# Patient Record
Sex: FEMALE | Race: WHITE | NOT HISPANIC OR LATINO | Employment: FULL TIME | URBAN - METROPOLITAN AREA
[De-identification: names, ages, dates, MRNs, and addresses within clinical notes are randomized per-mention and may not be internally consistent; named-entity substitution may affect disease eponyms.]

---
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
## 2018-02-06 ENCOUNTER — OFFICE VISIT (OUTPATIENT)
Dept: URGENT CARE | Facility: CLINIC | Age: 18
End: 2018-02-06
Payer: COMMERCIAL

## 2018-02-06 VITALS
HEART RATE: 71 BPM | SYSTOLIC BLOOD PRESSURE: 113 MMHG | BODY MASS INDEX: 20.7 KG/M2 | OXYGEN SATURATION: 97 % | TEMPERATURE: 97 F | WEIGHT: 92 LBS | RESPIRATION RATE: 18 BRPM | DIASTOLIC BLOOD PRESSURE: 77 MMHG | HEIGHT: 56 IN

## 2018-02-06 DIAGNOSIS — J06.9 UPPER RESPIRATORY TRACT INFECTION, UNSPECIFIED TYPE: Primary | ICD-10-CM

## 2018-02-06 PROCEDURE — 99214 OFFICE O/P EST MOD 30 MIN: CPT | Mod: S$GLB,,, | Performed by: FAMILY MEDICINE

## 2018-02-06 PROCEDURE — 3008F BODY MASS INDEX DOCD: CPT | Mod: S$GLB,,, | Performed by: FAMILY MEDICINE

## 2018-02-06 RX ORDER — TRIAMCINOLONE ACETONIDE 55 UG/1
2 SPRAY, METERED NASAL DAILY
Qty: 16.5 G | Refills: 0 | Status: SHIPPED | OUTPATIENT
Start: 2018-02-06 | End: 2019-08-01

## 2018-02-06 RX ORDER — BENZONATATE 100 MG/1
100 CAPSULE ORAL EVERY 6 HOURS PRN
Qty: 30 CAPSULE | Refills: 1 | Status: SHIPPED | OUTPATIENT
Start: 2018-02-06 | End: 2019-02-06

## 2018-02-06 NOTE — PROGRESS NOTES
"Subjective:       Patient ID: June Li is a 17 y.o. female.    Vitals:  height is 4' 8" (1.422 m) and weight is 41.7 kg (92 lb). Her temperature is 97 °F (36.1 °C). Her blood pressure is 113/77 and her pulse is 71. Her respiration is 18 and oxygen saturation is 97%.     Chief Complaint: Cough    Cough   This is a new problem. The current episode started in the past 7 days. The problem has been gradually worsening. The problem occurs every few minutes. The cough is productive of sputum. Associated symptoms include myalgias. Pertinent negatives include no chest pain, chills, ear pain, eye redness, fever, headaches, shortness of breath or wheezing. The symptoms are aggravated by lying down. Treatments tried: sudafed, dayquil and nyquil. The treatment provided no relief.     Review of Systems   Constitution: Negative for chills, fever and malaise/fatigue.   HENT: Positive for hoarse voice. Negative for congestion and ear pain.    Eyes: Negative for discharge and redness.   Cardiovascular: Negative for chest pain, dyspnea on exertion and leg swelling.   Respiratory: Positive for cough. Negative for shortness of breath, sputum production and wheezing.    Musculoskeletal: Positive for myalgias.   Gastrointestinal: Negative for abdominal pain and nausea.   Neurological: Negative for headaches.       Objective:      Physical Exam   Constitutional: She appears well-developed and well-nourished.   HENT:   Head: Normocephalic and atraumatic.   Nose: Mucosal edema and rhinorrhea present. Right sinus exhibits no maxillary sinus tenderness and no frontal sinus tenderness. Left sinus exhibits frontal sinus tenderness. Left sinus exhibits no maxillary sinus tenderness.   Mouth/Throat: Oropharynx is clear and moist.   Eyes: EOM are normal. Pupils are equal, round, and reactive to light.   Neck: Normal range of motion. Neck supple.   Cardiovascular: Normal rate, regular rhythm and normal heart sounds.    Pulmonary/Chest: " Effort normal and breath sounds normal.   Abdominal: Soft.   Lymphadenopathy:     She has cervical adenopathy (nontender).   Nursing note and vitals reviewed.      Assessment:       1. Upper respiratory tract infection, unspecified type        Plan:         Upper respiratory tract infection, unspecified type  -     benzonatate (TESSALON PERLES) 100 MG capsule; Take 1 capsule (100 mg total) by mouth every 6 (six) hours as needed for Cough.  Dispense: 30 capsule; Refill: 1  -     triamcinolone (NASACORT) 55 mcg nasal inhaler; 2 sprays by Nasal route once daily.  Dispense: 16.5 g; Refill: 0

## 2018-02-06 NOTE — PATIENT INSTRUCTIONS
Viral Upper Respiratory Illness (Adult)  You have a viral upper respiratory illness (URI), which is another term for the common cold. This illness is contagious during the first few days. It is spread through the air by coughing and sneezing. It may also be spread by direct contact (touching the sick person and then touching your own eyes, nose, or mouth). Frequent handwashing will decrease risk of spread. Most viral illnesses go away within 7 to 10 days with rest and simple home remedies. Sometimes the illness may last for several weeks. Antibiotics will not kill a virus, and they are generally not prescribed for this condition.    Home care  · If symptoms are severe, rest at home for the first 2 to 3 days. When you resume activity, don't let yourself get too tired.  · Avoid being exposed to cigarette smoke (yours or others).  · You may use acetaminophen or ibuprofen to control pain and fever, unless another medicine was prescribed. (Note: If you have chronic liver or kidney disease, have ever had a stomach ulcer or gastrointestinal bleeding, or are taking blood-thinning medicines, talk with your healthcare provider before using these medicines.) Aspirin should never be given to anyone under 18 years of age who is ill with a viral infection or fever. It may cause severe liver or brain damage.  · Your appetite may be poor, so a light diet is fine. Avoid dehydration by drinking 6 to 8 glasses of fluids per day (water, soft drinks, juices, tea, or soup). Extra fluids will help loosen secretions in the nose and lungs.  · Over-the-counter cold medicines will not shorten the length of time youre sick, but they may be helpful for the following symptoms: cough, sore throat, and nasal and sinus congestion. (Note: Do not use decongestants if you have high blood pressure.)  Follow-up care  Follow up with your healthcare provider, or as advised.  When to seek medical advice  Call your healthcare provider right away if any  of these occur:  · Cough with lots of colored sputum (mucus)  · Severe headache; face, neck, or ear pain  · Difficulty swallowing due to throat pain  · Fever of 100.4°F (38°C)  Call 911, or get immediate medical care  Call emergency services right away if any of these occur:  · Chest pain, shortness of breath, wheezing, or difficulty breathing  · Coughing up blood  · Inability to swallow due to throat pain  Date Last Reviewed: 9/13/2015  © 8433-2384 [employer]. [address] [place] [id number]. All rights reserved. This information is not intended as a substitute for professional medical care. Always follow your healthcare professional's instructions.

## 2019-06-28 ENCOUNTER — OFFICE VISIT (OUTPATIENT)
Dept: URGENT CARE | Facility: CLINIC | Age: 19
End: 2019-06-28
Payer: COMMERCIAL

## 2019-06-28 VITALS
SYSTOLIC BLOOD PRESSURE: 103 MMHG | OXYGEN SATURATION: 98 % | BODY MASS INDEX: 20.7 KG/M2 | WEIGHT: 92 LBS | DIASTOLIC BLOOD PRESSURE: 63 MMHG | HEART RATE: 88 BPM | HEIGHT: 56 IN | RESPIRATION RATE: 16 BRPM | TEMPERATURE: 98 F

## 2019-06-28 DIAGNOSIS — L03.031 PARONYCHIA OF GREAT TOE, RIGHT: Primary | ICD-10-CM

## 2019-06-28 PROCEDURE — 99214 OFFICE O/P EST MOD 30 MIN: CPT | Mod: S$GLB,,, | Performed by: NURSE PRACTITIONER

## 2019-06-28 PROCEDURE — 99214 PR OFFICE/OUTPT VISIT, EST, LEVL IV, 30-39 MIN: ICD-10-PCS | Mod: S$GLB,,, | Performed by: NURSE PRACTITIONER

## 2019-06-28 RX ORDER — MUPIROCIN 20 MG/G
OINTMENT TOPICAL
Qty: 22 G | Refills: 1 | Status: SHIPPED | OUTPATIENT
Start: 2019-06-28 | End: 2019-08-01

## 2019-06-28 RX ORDER — CEPHALEXIN 500 MG/1
500 CAPSULE ORAL EVERY 8 HOURS
Qty: 30 CAPSULE | Refills: 0 | Status: SHIPPED | OUTPATIENT
Start: 2019-06-28 | End: 2019-07-08

## 2019-06-28 NOTE — PROGRESS NOTES
"Subjective:       Patient ID: June Li is a 18 y.o. female.    Vitals:  height is 4' 8" (1.422 m) and weight is 41.7 kg (92 lb). Her oral temperature is 97.9 °F (36.6 °C). Her blood pressure is 103/63 and her pulse is 88. Her respiration is 16 and oxygen saturation is 98%.     Chief Complaint: Toe Pain    Toe Pain    The incident occurred 5 to 7 days ago. The incident occurred at home. There was no injury mechanism. The pain is present in the right toes (right great toe). The pain is at a severity of 9/10. The pain is severe. The pain has been constant since onset. Pertinent negatives include no inability to bear weight, loss of motion, loss of sensation, muscle weakness, numbness or tingling. She reports no foreign bodies present. The symptoms are aggravated by movement and weight bearing. She has tried acetaminophen for the symptoms. The treatment provided no relief.       Constitution: Negative for chills, fatigue and fever.   HENT: Negative for congestion and sore throat.    Neck: Negative for painful lymph nodes.   Cardiovascular: Negative for chest pain and leg swelling.   Eyes: Negative for double vision and blurred vision.   Respiratory: Negative for cough and shortness of breath.    Gastrointestinal: Negative for nausea, vomiting and diarrhea.   Genitourinary: Negative for dysuria, frequency, urgency and history of kidney stones.   Musculoskeletal: Negative for joint pain, joint swelling, muscle cramps and muscle ache.   Skin: Positive for erythema. Negative for color change, pale, rash and bruising.   Allergic/Immunologic: Negative for seasonal allergies.   Neurological: Negative for dizziness, history of vertigo, light-headedness, passing out, headaches and numbness.   Hematologic/Lymphatic: Negative for swollen lymph nodes.   Psychiatric/Behavioral: Negative for nervous/anxious, sleep disturbance and depression. The patient is not nervous/anxious.        Objective:      Physical Exam "   Constitutional: She is oriented to person, place, and time. She appears well-developed and well-nourished.   HENT:   Head: Normocephalic and atraumatic. Head is without abrasion, without contusion and without laceration.   Right Ear: External ear normal.   Left Ear: External ear normal.   Nose: Nose normal.   Eyes: Pupils are equal, round, and reactive to light. Conjunctivae, EOM and lids are normal.   Neck: Trachea normal, full passive range of motion without pain and phonation normal. Neck supple.   Cardiovascular: Normal rate, regular rhythm and normal heart sounds.   Pulmonary/Chest: Effort normal and breath sounds normal. No stridor. No respiratory distress.   Musculoskeletal: Normal range of motion.        Right foot: There is tenderness (TTP with erythema see diagram.  noted some abrasion to area as well.) and swelling. There is normal range of motion, no bony tenderness, normal capillary refill, no crepitus, no deformity and no laceration.        Feet:    Neurological: She is alert and oriented to person, place, and time.   Skin: Skin is warm and dry. Capillary refill takes less than 2 seconds. Abrasion noted. No bruising, no burn, no ecchymosis, no laceration, no lesion and no rash noted. She is not diaphoretic. There is erythema. No pallor.   Psychiatric: She has a normal mood and affect. Her speech is normal and behavior is normal. Judgment and thought content normal. Cognition and memory are normal.   Nursing note and vitals reviewed.      Assessment:       1. Paronychia of great toe, right        Plan:         Paronychia of great toe, right  -     cephALEXin (KEFLEX) 500 MG capsule; Take 1 capsule (500 mg total) by mouth every 8 (eight) hours. for 10 days  Dispense: 30 capsule; Refill: 0  -     mupirocin (BACTROBAN) 2 % ointment; Apply to affected area 3 times daily  Dispense: 22 g; Refill: 1      Patient Instructions   Please follow up with your Primary care provider within 2-5 days if your signs and  symptoms have not resolved or worsen.     If your condition worsens or fails to improve we recommend that you receive another evaluation at the emergency room immediately or contact your primary medical clinic to discuss your concerns.    You must understand that you have received an Urgent Care treatment only and that you may be released before all of your medical problems are known or treated.   You, the patient, will arrange for follow up care as instructed.       You have been given an antibiotic to treat your condition today.  Please complete the antibiotic as directed on the bottle.     As with any antibiotics, use probiotics and/or high culture yogurt about 2 hours apart from the antibiotic and about 1 week after the antibiotic to replace the gut ellie lost with antibiotic use.      If you are female and on BCP use additional methods to prevent pregnancy while on antibiotics and for one cycle after.         Paronychia of the Finger or Toe  Paronychia is an infection near a fingernail or toenail. It usually occurs when an opening in the cuticle or an ingrown toenail lets bacteria under the skin.  The infection will need to be drained if pus is present. If the infection has been caught early, you may need only antibiotic treatment. Healing will take about 1 to 2 weeks.  Home care  Follow these guidelines when caring for yourself at home:  · Clean and soak the toe or finger. Do this 2 times a day for the first 3 days. To do so:  ¨ Soak your foot or hand in a tub of warm water for 5 minutes. Or hold your toe or finger under a faucet of warm running water for 5 minutes.  ¨ Clean any crust away with soap and water using a cotton swab.  ¨ Put antibiotic ointment on the infected area.  · Change the dressing daily or any time it gets dirty.  · If you were given antibiotics, take them as directed until they are all gone.  · If your infection is on a toe, wear comfortable shoes with a lot of toe room. You can also wear  open-toed sandals while your toe heals.  · You may use over-the-counter medicine (acetaminophen or ibuprofen to help with pain, unless another medicine was prescribed. If you have chronic liver or kidney disease, talk with your healthcare provider before using these medicines. Also talk with your provider if you've had a stomach ulcer or GI (gastrointestinal) bleeding.  Prevention  The following can prevent paronychia:  · Avoid cutting or playing with your cuticles at home.  · Don't bite your nails.  · Don't suck on your thumbs or fingers.  Follow-up care  Follow up with your healthcare provider, or as advised.  When to seek medical advice  Call your healthcare provider right away if any of these occur:  · Redness, pain, or swelling of the finger or toe gets worse  · Red streaks in the skin leading away from the wound  · Pus or fluid draining from the nail area  · Fever of 100.4ºF (38ºC) or higher, or as directed by your provider  Date Last Reviewed: 8/1/2016 © 2000-2017 [employer]. [address] [place] [address], [place] [address]. All rights reserved. This information is not intended as a substitute for professional medical care. Always follow your healthcare professional's instructions.

## 2019-06-28 NOTE — PATIENT INSTRUCTIONS
Please follow up with your Primary care provider within 2-5 days if your signs and symptoms have not resolved or worsen.     If your condition worsens or fails to improve we recommend that you receive another evaluation at the emergency room immediately or contact your primary medical clinic to discuss your concerns.    You must understand that you have received an Urgent Care treatment only and that you may be released before all of your medical problems are known or treated.   You, the patient, will arrange for follow up care as instructed.       You have been given an antibiotic to treat your condition today.  Please complete the antibiotic as directed on the bottle.     As with any antibiotics, use probiotics and/or high culture yogurt about 2 hours apart from the antibiotic and about 1 week after the antibiotic to replace the gut ellie lost with antibiotic use.      If you are female and on BCP use additional methods to prevent pregnancy while on antibiotics and for one cycle after.         Paronychia of the Finger or Toe  Paronychia is an infection near a fingernail or toenail. It usually occurs when an opening in the cuticle or an ingrown toenail lets bacteria under the skin.  The infection will need to be drained if pus is present. If the infection has been caught early, you may need only antibiotic treatment. Healing will take about 1 to 2 weeks.  Home care  Follow these guidelines when caring for yourself at home:  · Clean and soak the toe or finger. Do this 2 times a day for the first 3 days. To do so:  ¨ Soak your foot or hand in a tub of warm water for 5 minutes. Or hold your toe or finger under a faucet of warm running water for 5 minutes.  ¨ Clean any crust away with soap and water using a cotton swab.  ¨ Put antibiotic ointment on the infected area.  · Change the dressing daily or any time it gets dirty.  · If you were given antibiotics, take them as directed until they are all gone.  · If your  infection is on a toe, wear comfortable shoes with a lot of toe room. You can also wear open-toed sandals while your toe heals.  · You may use over-the-counter medicine (acetaminophen or ibuprofen to help with pain, unless another medicine was prescribed. If you have chronic liver or kidney disease, talk with your healthcare provider before using these medicines. Also talk with your provider if you've had a stomach ulcer or GI (gastrointestinal) bleeding.  Prevention  The following can prevent paronychia:  · Avoid cutting or playing with your cuticles at home.  · Don't bite your nails.  · Don't suck on your thumbs or fingers.  Follow-up care  Follow up with your healthcare provider, or as advised.  When to seek medical advice  Call your healthcare provider right away if any of these occur:  · Redness, pain, or swelling of the finger or toe gets worse  · Red streaks in the skin leading away from the wound  · Pus or fluid draining from the nail area  · Fever of 100.4ºF (38ºC) or higher, or as directed by your provider  Date Last Reviewed: 8/1/2016  © 5860-6832 [employer]. [address] [place] [address], [place] [address]. All rights reserved. This information is not intended as a substitute for professional medical care. Always follow your healthcare professional's instructions.

## 2019-07-18 DIAGNOSIS — Z00.00 ROUTINE GENERAL MEDICAL EXAMINATION AT A HEALTH CARE FACILITY: Primary | ICD-10-CM

## 2019-08-01 ENCOUNTER — OFFICE VISIT (OUTPATIENT)
Dept: INTERNAL MEDICINE | Facility: CLINIC | Age: 19
End: 2019-08-01
Payer: COMMERCIAL

## 2019-08-01 ENCOUNTER — CLINICAL SUPPORT (OUTPATIENT)
Dept: INTERNAL MEDICINE | Facility: CLINIC | Age: 19
End: 2019-08-01
Payer: COMMERCIAL

## 2019-08-01 ENCOUNTER — HOSPITAL ENCOUNTER (OUTPATIENT)
Dept: RADIOLOGY | Facility: HOSPITAL | Age: 19
Discharge: HOME | End: 2019-08-01
Attending: INTERNAL MEDICINE
Payer: COMMERCIAL

## 2019-08-01 VITALS
WEIGHT: 86 LBS | BODY MASS INDEX: 19.9 KG/M2 | DIASTOLIC BLOOD PRESSURE: 60 MMHG | HEIGHT: 55 IN | SYSTOLIC BLOOD PRESSURE: 108 MMHG | HEART RATE: 82 BPM | OXYGEN SATURATION: 98 %

## 2019-08-01 DIAGNOSIS — N91.2 AMENORRHEA: ICD-10-CM

## 2019-08-01 DIAGNOSIS — Z00.00 ANNUAL PHYSICAL EXAM: Primary | ICD-10-CM

## 2019-08-01 DIAGNOSIS — Z00.00 ROUTINE GENERAL MEDICAL EXAMINATION AT A HEALTH CARE FACILITY: ICD-10-CM

## 2019-08-01 DIAGNOSIS — E03.9 HYPOTHYROIDISM, UNSPECIFIED TYPE: ICD-10-CM

## 2019-08-01 LAB
ALBUMIN SERPL BCP-MCNC: 3.6 G/DL (ref 3.5–5.2)
ALP SERPL-CCNC: 83 U/L (ref 55–135)
ALT SERPL W/O P-5'-P-CCNC: 13 U/L (ref 10–44)
ANION GAP SERPL CALC-SCNC: 8 MMOL/L (ref 8–16)
AST SERPL-CCNC: 22 U/L (ref 10–40)
BILIRUB SERPL-MCNC: 0.2 MG/DL (ref 0.1–1)
BUN SERPL-MCNC: 15 MG/DL (ref 6–20)
CALCIUM SERPL-MCNC: 9.3 MG/DL (ref 8.7–10.5)
CHLORIDE SERPL-SCNC: 103 MMOL/L (ref 95–110)
CHOLEST SERPL-MCNC: 181 MG/DL (ref 120–199)
CHOLEST/HDLC SERPL: 3 {RATIO} (ref 2–5)
CO2 SERPL-SCNC: 28 MMOL/L (ref 23–29)
CREAT SERPL-MCNC: 0.7 MG/DL (ref 0.5–1.4)
ERYTHROCYTE [DISTWIDTH] IN BLOOD BY AUTOMATED COUNT: 12.5 % (ref 11.5–14.5)
EST. GFR  (AFRICAN AMERICAN): >60 ML/MIN/1.73 M^2
EST. GFR  (NON AFRICAN AMERICAN): >60 ML/MIN/1.73 M^2
GLUCOSE SERPL-MCNC: 72 MG/DL (ref 70–110)
HCT VFR BLD AUTO: 41.5 % (ref 37–48.5)
HDLC SERPL-MCNC: 61 MG/DL (ref 40–75)
HDLC SERPL: 33.7 % (ref 20–50)
HGB BLD-MCNC: 13.6 G/DL (ref 12–16)
HIV 1+2 AB+HIV1 P24 AG SERPL QL IA: NEGATIVE
LDLC SERPL CALC-MCNC: 108 MG/DL (ref 63–159)
MCH RBC QN AUTO: 29.1 PG (ref 27–31)
MCHC RBC AUTO-ENTMCNC: 32.8 G/DL (ref 32–36)
MCV RBC AUTO: 89 FL (ref 82–98)
NONHDLC SERPL-MCNC: 120 MG/DL
PLATELET # BLD AUTO: 254 K/UL (ref 150–350)
PMV BLD AUTO: 9 FL (ref 9.2–12.9)
POTASSIUM SERPL-SCNC: 4 MMOL/L (ref 3.5–5.1)
PROT SERPL-MCNC: 7.4 G/DL (ref 6–8.4)
RBC # BLD AUTO: 4.67 M/UL (ref 4–5.4)
SODIUM SERPL-SCNC: 139 MMOL/L (ref 136–145)
TRIGL SERPL-MCNC: 60 MG/DL (ref 30–150)
WBC # BLD AUTO: 6.19 K/UL (ref 3.9–12.7)

## 2019-08-01 PROCEDURE — 71046 XR CHEST PA AND LATERAL: ICD-10-PCS | Mod: 26,,, | Performed by: RADIOLOGY

## 2019-08-01 PROCEDURE — 36415 COLL VENOUS BLD VENIPUNCTURE: CPT

## 2019-08-01 PROCEDURE — 80061 LIPID PANEL: CPT

## 2019-08-01 PROCEDURE — 71046 X-RAY EXAM CHEST 2 VIEWS: CPT | Mod: TC,FY

## 2019-08-01 PROCEDURE — 85027 COMPLETE CBC AUTOMATED: CPT

## 2019-08-01 PROCEDURE — 99999 PR PBB SHADOW E&M-EST. PATIENT-LVL III: ICD-10-PCS | Mod: PBBFAC,,, | Performed by: INTERNAL MEDICINE

## 2019-08-01 PROCEDURE — 99999 PR PBB SHADOW E&M-EST. PATIENT-LVL III: CPT | Mod: PBBFAC,,, | Performed by: INTERNAL MEDICINE

## 2019-08-01 PROCEDURE — 86703 HIV-1/HIV-2 1 RESULT ANTBDY: CPT

## 2019-08-01 PROCEDURE — 80053 COMPREHEN METABOLIC PANEL: CPT

## 2019-08-01 PROCEDURE — 71046 X-RAY EXAM CHEST 2 VIEWS: CPT | Mod: 26,,, | Performed by: RADIOLOGY

## 2019-08-01 PROCEDURE — 99385 PREV VISIT NEW AGE 18-39: CPT | Mod: S$PBB,,, | Performed by: INTERNAL MEDICINE

## 2019-08-01 PROCEDURE — 99385 PR PREVENTIVE VISIT,NEW,18-39: ICD-10-PCS | Mod: S$PBB,,, | Performed by: INTERNAL MEDICINE

## 2019-08-01 NOTE — LETTER
August 6, 2019    June Li  [address] [facility] [facility] [address] [place] [address]             [facility]  [address]  [facility] [address]  Phone: [phone]  Fax: [phone] Dear Ms. Li:    Thank you for allowing me to serve you and perform your Executive Health exam on 8/1/2019.  This letter will serve a brief summary of the history, physical findings, and laboratory/studies performed and recommendations at that time.    Reason for Visit: Executive Health Preventive Physical Examination    Past Medical History:   Diagnosis Date    Hypothyroidism     Hypothyroidism 8/1/2019       History reviewed. No pertinent surgical history.    Family History   Problem Relation Age of Onset    No Known Problems Mother     No Known Problems Father     No Known Problems Sister     No Known Problems Brother        Social History     Socioeconomic History    Marital status: Single     Spouse name: Not on file    Number of children: Not on file    Years of education: Not on file    Highest education level: Not on file   Occupational History    Occupation:    Social Needs    Financial resource strain: Not on file    Food insecurity:     Worry: Not on file     Inability: Not on file    Transportation needs:     Medical: Not on file     Non-medical: Not on file   Tobacco Use    Smoking status: Never Smoker    Smokeless tobacco: Never Used   Substance and Sexual Activity    Alcohol use: No     Comment: minimal    Drug use: No    Sexual activity: Never   Lifestyle    Physical activity:     Days per week: Not on file     Minutes per session: Not on file    Stress: Not on file   Relationships    Social connections:     Talks on phone: Not on file     Gets together: Not on file     Attends [employer] service: Not on file     Active member of club or organization: Not on file     Attends meetings of clubs or organizations: Not on file     Relationship status: Not on file    Other Topics Concern    Not on file   Social History Narrative    Exercise:  Gym 3 days a wek.        Review of patient's allergies indicates:   Allergen Reactions    Sulfa (sulfonamide antibiotics) Swelling         Current Outpatient Medications:     levothyroxine (SYNTHROID) 50 MCG tablet, Take 50 mcg by mouth once daily., Disp: , Rfl:      Review of Systems  Review of Systems - Negative except for chronic amenorrhea, managed by Dr Tsang.    Physical Exam:  General: General appearance: alert, well appearing, and in no distress.   Skin: Skin exam - normal coloration and turgor, no rashes, no suspicious skin lesions noted.  HEENT: Ears - bilateral TM's and external ear canals normal. , ENT exam reveals - ENT exam normal, no neck nodes or sinus tenderness.   Lungs: Chest: clear to auscultation, no wheezes, rales or rhonchi, symmetric air entry.   Heart: CVS exam: normal rate, regular rhythm, normal S1, S2, no murmurs, rubs, clicks or gallops.   Extremities: Exam of extremities: peripheral pulses normal, no pedal edema, no clubbing or cyanosis    Labs:  Results for orders placed or performed in visit on 08/01/19   Comprehensive metabolic panel   Result Value Ref Range    Sodium 139 136 - 145 mmol/L    Potassium 4.0 3.5 - 5.1 mmol/L    Chloride 103 95 - 110 mmol/L    CO2 28 23 - 29 mmol/L    Glucose 72 70 - 110 mg/dL    BUN, Bld 15 6 - 20 mg/dL    Creatinine 0.7 0.5 - 1.4 mg/dL    Calcium 9.3 8.7 - 10.5 mg/dL    Total Protein 7.4 6.0 - 8.4 g/dL    Albumin 3.6 3.5 - 5.2 g/dL    Total Bilirubin 0.2 0.1 - 1.0 mg/dL    Alkaline Phosphatase 83 55 - 135 U/L    AST 22 10 - 40 U/L    ALT 13 10 - 44 U/L    Anion Gap 8 8 - 16 mmol/L    eGFR if African American >60.0 >60 mL/min/1.73 m^2    eGFR if non African American >60.0 >60 mL/min/1.73 m^2   CBC Without Differential   Result Value Ref Range    WBC 6.19 3.90 - 12.70 K/uL    RBC 4.67 4.00 - 5.40 M/uL    Hemoglobin 13.6 12.0 - 16.0 g/dL    Hematocrit 41.5 37.0 - 48.5 %     Mean Corpuscular Volume 89 82 - 98 fL    Mean Corpuscular Hemoglobin 29.1 27.0 - 31.0 pg    Mean Corpuscular Hemoglobin Conc 32.8 32.0 - 36.0 g/dL    RDW 12.5 11.5 - 14.5 %    Platelets 254 150 - 350 K/uL    MPV 9.0 (L) 9.2 - 12.9 fL   Lipid panel   Result Value Ref Range    Cholesterol 181 120 - 199 mg/dL    Triglycerides 60 30 - 150 mg/dL    HDL 61 40 - 75 mg/dL    LDL Cholesterol 108.0 63.0 - 159.0 mg/dL    Hdl/Cholesterol Ratio 33.7 20.0 - 50.0 %    Total Cholesterol/HDL Ratio 3.0 2.0 - 5.0    Non-HDL Cholesterol 120 mg/dL   HIV 1/2 Ag/Ab (4th Gen)   Result Value Ref Range    HIV 1/2 Ag/Ab Negative Negative        Assessment/Recommendations:  Routine Health Maintenance    At this time, you appear to be in good medical condition.  I am awaiting a call back from Dr Tsang, as I don't have a clear diagnosis to explain your amenorrhea.     I look forward to seeing you again next year.  Please contact me should you have any questions or concerns regarding physical findings, or my recommendations.    If you have any questions or concerns, please don't hesitate to call.    Sincerely,    Leda Dangelo MD

## 2019-08-01 NOTE — PROGRESS NOTES
Subjective:       Patient ID: June Li is a 19 y.o. female.    Chief Complaint: Executive Health    HPI   SJ studying business.    Amenorrheic, lifelong.      Stopped growing in height at 9, due to inherited cause.     Sees a Reproductive Endocrinologist.  Started on OCP, which increased thirst, so she stopped.  Neither she nor mother are sure of diagnosis..      Hypothyroidism, tx with synthroid.    She plans to see a gynecologist soon.        Review of Systems   Constitutional: Negative for fever and unexpected weight change.   HENT: Negative for congestion and postnasal drip.    Respiratory: Negative for chest tightness, shortness of breath and wheezing.    Cardiovascular: Negative for chest pain and leg swelling.   Gastrointestinal: Negative for abdominal pain, anal bleeding, constipation, diarrhea, nausea and vomiting.   Genitourinary: Negative for dysuria and urgency.   Skin: Negative for rash.   Neurological: Negative for headaches.   Psychiatric/Behavioral: Negative for dysphoric mood and sleep disturbance. The patient is not nervous/anxious.        Objective:      Physical Exam   Constitutional: She is oriented to person, place, and time. She appears well-developed and well-nourished. No distress.   HENT:   Head: Normocephalic and atraumatic.   Mouth/Throat: Oropharynx is clear and moist. No oropharyngeal exudate.   Tm's clear   Eyes: No scleral icterus.   Neck: Neck supple. No JVD present. No thyromegaly present.   Cardiovascular: Normal rate, regular rhythm and normal heart sounds.   Pulmonary/Chest: Effort normal and breath sounds normal. No respiratory distress. She has no wheezes. She has no rales.   Abdominal: Soft. She exhibits no mass. There is no tenderness.   Musculoskeletal: She exhibits no edema.   Lymphadenopathy:     She has no cervical adenopathy.   Neurological: She is alert and oriented to person, place, and time.   Psychiatric: She has a normal mood and affect. Her behavior is  normal.       Results for orders placed or performed in visit on 08/01/19   Comprehensive metabolic panel   Result Value Ref Range    Sodium 139 136 - 145 mmol/L    Potassium 4.0 3.5 - 5.1 mmol/L    Chloride 103 95 - 110 mmol/L    CO2 28 23 - 29 mmol/L    Glucose 72 70 - 110 mg/dL    BUN, Bld 15 6 - 20 mg/dL    Creatinine 0.7 0.5 - 1.4 mg/dL    Calcium 9.3 8.7 - 10.5 mg/dL    Total Protein 7.4 6.0 - 8.4 g/dL    Albumin 3.6 3.5 - 5.2 g/dL    Total Bilirubin 0.2 0.1 - 1.0 mg/dL    Alkaline Phosphatase 83 55 - 135 U/L    AST 22 10 - 40 U/L    ALT 13 10 - 44 U/L    Anion Gap 8 8 - 16 mmol/L    eGFR if African American >60.0 >60 mL/min/1.73 m^2    eGFR if non African American >60.0 >60 mL/min/1.73 m^2   CBC Without Differential   Result Value Ref Range    WBC 6.19 3.90 - 12.70 K/uL    RBC 4.67 4.00 - 5.40 M/uL    Hemoglobin 13.6 12.0 - 16.0 g/dL    Hematocrit 41.5 37.0 - 48.5 %    Mean Corpuscular Volume 89 82 - 98 fL    Mean Corpuscular Hemoglobin 29.1 27.0 - 31.0 pg    Mean Corpuscular Hemoglobin Conc 32.8 32.0 - 36.0 g/dL    RDW 12.5 11.5 - 14.5 %    Platelets 254 150 - 350 K/uL    MPV 9.0 (L) 9.2 - 12.9 fL   Lipid panel   Result Value Ref Range    Cholesterol 181 120 - 199 mg/dL    Triglycerides 60 30 - 150 mg/dL    HDL 61 40 - 75 mg/dL    LDL Cholesterol 108.0 63.0 - 159.0 mg/dL    Hdl/Cholesterol Ratio 33.7 20.0 - 50.0 %    Total Cholesterol/HDL Ratio 3.0 2.0 - 5.0    Non-HDL Cholesterol 120 mg/dL     Assessment:       1. Annual physical exam    2. Hypothyroidism, unspecified type    3. Amenorrhea                     - will confer with Dr Tsang for diagnosis.  [phone]  Plan:       June CAMACHO was seen today for [facility].    Diagnoses and all orders for this visit:    Annual physical exam    Hypothyroidism, unspecified type    Amenorrhea

## 2019-08-13 ENCOUNTER — TELEPHONE (OUTPATIENT)
Dept: INTERNAL MEDICINE | Facility: CLINIC | Age: 19
End: 2019-08-13

## 2019-08-13 DIAGNOSIS — N91.2 AMENORRHEA: Primary | ICD-10-CM

## 2019-08-13 NOTE — TELEPHONE ENCOUNTER
pls call pt - I spoke to Dr Tsang - he doesn't have a diagnosis to explain why she is not having periods.     He did recommend checking a prolactin level.    i'd also like to check estradiol and FSH.    Pls schedule.

## 2019-08-16 ENCOUNTER — LAB VISIT (OUTPATIENT)
Dept: LAB | Facility: HOSPITAL | Age: 19
End: 2019-08-16
Attending: INTERNAL MEDICINE
Payer: COMMERCIAL

## 2019-08-16 DIAGNOSIS — N91.2 AMENORRHEA: ICD-10-CM

## 2019-08-16 LAB
ESTRADIOL SERPL-MCNC: 70 PG/ML
FSH SERPL-ACNC: 5.5 MIU/ML
PROLACTIN SERPL IA-MCNC: 46.8 NG/ML (ref 5.2–26.5)

## 2019-08-16 PROCEDURE — 82670 ASSAY OF TOTAL ESTRADIOL: CPT

## 2019-08-16 PROCEDURE — 36415 COLL VENOUS BLD VENIPUNCTURE: CPT | Mod: PO

## 2019-08-16 PROCEDURE — 84146 ASSAY OF PROLACTIN: CPT

## 2019-08-16 PROCEDURE — 83001 ASSAY OF GONADOTROPIN (FSH): CPT

## 2019-08-25 DIAGNOSIS — E22.1 HYPERPROLACTINEMIA: Primary | ICD-10-CM

## 2019-08-26 ENCOUNTER — TELEPHONE (OUTPATIENT)
Dept: INTERNAL MEDICINE | Facility: CLINIC | Age: 19
End: 2019-08-26

## 2019-08-26 NOTE — TELEPHONE ENCOUNTER
----- Message from Leda Dangelo MD sent at 8/25/2019  9:17 PM CDT -----  pls  Call - Prolactin, a hormone produced by the pituitary gland in the brain is mildly elevated.  This may be contributing to lack of periods.     I'd recommend MRI of brain to look more closely at the pituitary gland.  pls schedule.     FSH and estrogen levels are normal.     If she'd like I can call her later.

## 2019-08-26 NOTE — TELEPHONE ENCOUNTER
Spoke with pt, notified of results and scheduled MRI for Friday. Pt says she would like Dr. Dangelo to contact her mom and inform her please [phone]

## 2019-08-26 NOTE — TELEPHONE ENCOUNTER
----- Message from Aundrea Peguero sent at 8/26/2019  3:51 PM CDT -----  Contact: Father  Father is calling to speak with Staff regarding a test scheduled for the pt.    He can be reached at [phone].    Thank you.

## 2019-08-27 ENCOUNTER — TELEPHONE (OUTPATIENT)
Dept: INTERNAL MEDICINE | Facility: CLINIC | Age: 19
End: 2019-08-27

## 2019-08-27 DIAGNOSIS — F02.80 DEMENTIA IN HYDROCEPHALUS: ICD-10-CM

## 2019-08-27 DIAGNOSIS — G91.9 DEMENTIA IN HYDROCEPHALUS: ICD-10-CM

## 2019-08-27 NOTE — TELEPHONE ENCOUNTER
Pt called wanting to know if she can have her MRI done at [facility]. Can an external referral be placed?

## 2019-09-10 ENCOUNTER — TELEPHONE (OUTPATIENT)
Dept: INTERNAL MEDICINE | Facility: CLINIC | Age: 19
End: 2019-09-10

## 2019-09-10 NOTE — TELEPHONE ENCOUNTER
----- Message from Tara Ya sent at 9/10/2019 12:24 PM CDT -----  Contact: patient [phone]  Patient called again to confirm whether you received the second copy of the mri from yesterday that was faxed to you today.

## 2019-09-10 NOTE — TELEPHONE ENCOUNTER
----- Message from Dary Hubbard sent at 9/10/2019 11:56 AM CDT -----  Contact: self [phone]  Type: Test Results    What test was performed? MRI    Who ordered the test? [facility]    When and where were the test performed? Yesterday at [facility]    Comments:please call patient back

## 2019-09-11 ENCOUNTER — TELEPHONE (OUTPATIENT)
Dept: INTERNAL MEDICINE | Facility: CLINIC | Age: 19
End: 2019-09-11

## 2019-09-11 DIAGNOSIS — G93.89 SUPRASELLAR MASS: Primary | ICD-10-CM

## 2019-09-11 DIAGNOSIS — E22.1 HYPERPROLACTINEMIA: ICD-10-CM

## 2019-09-11 RX ORDER — AMOXICILLIN AND CLAVULANATE POTASSIUM 875; 125 MG/1; MG/1
1 TABLET, FILM COATED ORAL 2 TIMES DAILY
Qty: 20 TABLET | Refills: 0 | Status: SHIPPED | OUTPATIENT
Start: 2019-09-11 | End: 2019-09-16 | Stop reason: SDUPTHER

## 2019-09-11 NOTE — TELEPHONE ENCOUNTER
MRI without contrast reviewed.       Heterogeneous process suprasellar region.  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  Pituitary protocol MRI rec'd.        Pt and mother notified.         Additionally - chronic sinusitis changes of R ethmoid, maxilary and L fronto ethmoid air cells noted.  She c/o sinus congestion and facial pain.  Will call in augmentin.

## 2019-09-12 ENCOUNTER — TELEPHONE (OUTPATIENT)
Dept: INTERNAL MEDICINE | Facility: CLINIC | Age: 19
End: 2019-09-12

## 2019-09-12 ENCOUNTER — TELEPHONE (OUTPATIENT)
Dept: NEUROSURGERY | Facility: CLINIC | Age: 19
End: 2019-09-12

## 2019-09-12 DIAGNOSIS — D44.4 CRANIOPHARYNGIOMA: Primary | ICD-10-CM

## 2019-09-12 NOTE — TELEPHONE ENCOUNTER
----- Message from Leonora May sent at 9/12/2019  9:03 AM CDT -----  Contact: pt father Ruby [phone]  Pt father would like discuss MRI test done 9/11 at outside facility.  Please advise

## 2019-09-12 NOTE — TELEPHONE ENCOUNTER
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  Will scan report into [facility]    Will schedule appt with Dr Maggie reynolds

## 2019-09-12 NOTE — TELEPHONE ENCOUNTER
Called pt and scheduled consult with Dr. Serrano for tomorrow. She will bring copy of her MRI on disc.

## 2019-09-13 ENCOUNTER — TELEPHONE (OUTPATIENT)
Dept: NEUROSURGERY | Facility: CLINIC | Age: 19
End: 2019-09-13

## 2019-09-13 ENCOUNTER — OFFICE VISIT (OUTPATIENT)
Dept: NEUROSURGERY | Facility: CLINIC | Age: 19
End: 2019-09-13
Payer: COMMERCIAL

## 2019-09-13 VITALS
BODY MASS INDEX: 20.96 KG/M2 | WEIGHT: 90.19 LBS | TEMPERATURE: 98 F | DIASTOLIC BLOOD PRESSURE: 64 MMHG | SYSTOLIC BLOOD PRESSURE: 103 MMHG | HEART RATE: 82 BPM

## 2019-09-13 DIAGNOSIS — D44.4 CRANIOPHARYNGIOMA: Primary | ICD-10-CM

## 2019-09-13 DIAGNOSIS — D44.4 CRANIOPHARYNGIOMA IN ADULT: Primary | ICD-10-CM

## 2019-09-13 DIAGNOSIS — E34.30 SHORT STATURE DUE TO ENDOCRINE DISORDER: ICD-10-CM

## 2019-09-13 PROCEDURE — 99999 PR PBB SHADOW E&M-EST. PATIENT-LVL III: CPT | Mod: PBBFAC,,, | Performed by: NEUROLOGICAL SURGERY

## 2019-09-13 PROCEDURE — 99999 PR PBB SHADOW E&M-EST. PATIENT-LVL III: ICD-10-PCS | Mod: PBBFAC,,, | Performed by: NEUROLOGICAL SURGERY

## 2019-09-13 PROCEDURE — 99204 OFFICE O/P NEW MOD 45 MIN: CPT | Mod: S$GLB,,, | Performed by: NEUROLOGICAL SURGERY

## 2019-09-13 PROCEDURE — 99204 PR OFFICE/OUTPT VISIT, NEW, LEVL IV, 45-59 MIN: ICD-10-PCS | Mod: S$GLB,,, | Performed by: NEUROLOGICAL SURGERY

## 2019-09-13 NOTE — LETTER
September 17, 2019      Leda Dangelo MD  [address]  [facility] [address]           [facility] Neuro[facility] 7th Fl  [address]  [facility] [address]  Phone: [phone]          Patient: June Li   MR Number: [id number]   YOB: 2000   Date of Visit: 9/13/2019       Dear Dr. Leda Dangelo:    Thank you for referring June Li to me for evaluation. Attached you will find relevant portions of my assessment and plan of care.    If you have questions, please do not hesitate to call me. I look forward to following June Li along with you.    Sincerely,    Jung Serrano MD    Enclosure  CC:  No Recipients    If you would like to receive this communication electronically, please contact [email or web address] or [phone] to request more information on [employer] access.    For providers and/or their staff who would like to refer a patient to [facility], please contact us through our one-stop-shop provider referral line, [facility], at [phone].    If you feel you have received this communication in error or would no longer like to receive these types of communications, please e-mail [email or web address]

## 2019-09-13 NOTE — PROGRESS NOTES
"Subjective:   I, Raven Stewart, attest that this documentation has been prepared under the direction and in the presence of Jung Serrano MD.     Patient ID: June Li is a 19 y.o. female     Chief Complaint: Consult      HPI  Ms. June Li is a pleasant 19 y.o. woman who presents today for consultation regarding recently craniopharyngioma. Pt is 4'7" in height and has a fraternal twin sister, Nataly, who's 5'8'. Her parents say they noticed a difference in their heights around the age of 3. She has been seen by multiple physicians in the past with no confirmed etiology to account for her stunted growth. She had an absent menses, which prompted a follow up with an Endocrinologist, and was treated hormonally with no success. She had a new physical after her 19st birthday and had blood work that prompted a MRI Brain. Pt endorses pronounced polydipsia and states she sometimes drinks to the point of vomiting secondary to insatiable thirst. She states she goes to sleep at 11 pm and wakes up around 5-6am. She endorses waking up at least 1-2 times a night secondary to excessive thirst and is able to fell asleep without issue. She states she was on a contraceptive that elicited a menstrual cycle in the past but it unfortunately caused an exacerbation of her polydipsia, in addition to bodily shakes. She has a hx of headaches and episodes of blurry vision, which have both subsided. She denies excessive hunger. She is accompanied by both of her parents and younger brother today.      Review of Systems   Constitutional: Negative for activity change, fatigue, fever and unexpected weight change.   HENT: Negative for facial swelling.    Eyes: Positive for visual disturbance (one episode of blurry vision).   Respiratory: Negative.    Cardiovascular: Negative.    Gastrointestinal: Negative for diarrhea, nausea and vomiting.   Endocrine: Positive for polydipsia.        Positive for amenorrhea. Negative for excessive " hunger.   Genitourinary: Negative.    Musculoskeletal: Negative for back pain, joint swelling, myalgias and neck pain.   Neurological: Positive for headaches (in the past; now resolved). Negative for dizziness, weakness and numbness.   Psychiatric/Behavioral: Negative.       Past Medical History:   Diagnosis Date    Hypothyroidism     Hypothyroidism 8/1/2019       Objective:      Vitals:    09/13/19 1435   BP: 103/64   Pulse: 82   Temp: 98.3 °F (36.8 °C)      Physical Exam   Constitutional: She is oriented to person, place, and time. She appears well-developed and well-nourished.   HENT:   Head: Normocephalic and atraumatic.   Eyes:   No visual field cuts.   Neck: Neck supple.   Neurological: She is alert and oriented to person, place, and time. No cranial nerve deficit. She displays a negative Romberg sign. GCS eye subscore is 4. GCS verbal subscore is 5. GCS motor subscore is 6.         IMAGING:  MRI Brain (outside disc) shows a 1.93 cm (in its greatest extent) x 1.7 cm cystic tumor attached to the pituitary stalk with features typical for a craniopharyngioma. The tumor abuts the optic chiasm and the hypothalamus.     I have personally reviewed the images with the pt.      I, Dr. Jung Serrano, personally performed the services described in this documentation. All medical record entries made by the scribe, Raven Stewart, were at my direction and in my presence.  I have reviewed the chart and agree that the record reflects my personal performance and is accurate and complete. Jung Serrano MD. 09/13/2019    Assessment:       Craniopharyngioma.     Plan:   I have personally reviewed the MRI Brain with the pt which shows a 1.93 cm (in its greatest extent) x 1.7 cm cystic tumor attached to the pituitary stalk with features typical for a craniopharyngioma. The tumor abuts both the optic chiasm and the hypothalamus.    I will refer the pt to Dr. Petit, Neuro-ophthalmologist, for full visual field and OTC testing.    I  will schedule the patient for follow up in our multidisciplinary pituitary clinic with Dr. Almonte and myself, after the eye exam is complete, to discuss appropriate treatment options in greater detail and establish a plan.

## 2019-09-13 NOTE — PATIENT INSTRUCTIONS
I have personally reviewed the MRI Brain with the pt which shows a 1.93 cm (in its greatest extent) x 1.7 cm cystic tumor attached to the pituitary stalk with features typical for a craniopharyngioma. The tumor abuts both the optic chiasm and the hypothalamus.    I will refer the pt to Dr. Petit, Neuro-ophthalmologist, for full visual field and OTC testing.    I will schedule the patient for follow up in our multidisciplinary pituitary clinic with Dr. Almonte and myself, after the eye exam is complete, to discuss appropriate treatment options in greater detail and establish a plan.

## 2019-09-16 ENCOUNTER — TELEPHONE (OUTPATIENT)
Dept: NEUROSURGERY | Facility: CLINIC | Age: 19
End: 2019-09-16

## 2019-09-16 ENCOUNTER — TELEPHONE (OUTPATIENT)
Dept: ENDOCRINOLOGY | Facility: CLINIC | Age: 19
End: 2019-09-16

## 2019-09-16 ENCOUNTER — TELEPHONE (OUTPATIENT)
Dept: INTERNAL MEDICINE | Facility: CLINIC | Age: 19
End: 2019-09-16

## 2019-09-16 DIAGNOSIS — D44.4 CRANIOPHARYNGIOMA: Primary | ICD-10-CM

## 2019-09-16 RX ORDER — AMOXICILLIN AND CLAVULANATE POTASSIUM 875; 125 MG/1; MG/1
1 TABLET, FILM COATED ORAL 2 TIMES DAILY
Qty: 20 TABLET | Refills: 0 | Status: SHIPPED | OUTPATIENT
Start: 2019-09-16 | End: 2019-09-26

## 2019-09-16 NOTE — TELEPHONE ENCOUNTER
----- Message from Tara Hernandez sent at 9/16/2019  1:09 PM CDT -----  Contact: father/darius/[phone]  Pt father called in regards to talking to the office about blood work that was done a couple of weeks ago and the mri that she had done.       Please advise

## 2019-09-27 ENCOUNTER — TELEPHONE (OUTPATIENT)
Dept: INTERNAL MEDICINE | Facility: CLINIC | Age: 19
End: 2019-09-27

## 2019-09-27 NOTE — TELEPHONE ENCOUNTER
----- Message from Nimisha Law sent at 9/27/2019  3:50 PM CDT -----  Contact: [phone]/ Mr. Li/father  Patient's father is requesting a call from the doctor regarding f/u care.    Please advise, thank you

## 2019-09-27 NOTE — TELEPHONE ENCOUNTER
Spoke to pt's father he would like to speak with you Dr Dangelo in regards to his daughter.  I did let him know that you were out today (Friday)   He said that would be fine and this can wait til Monday no urgency

## 2019-10-01 ENCOUNTER — TELEPHONE (OUTPATIENT)
Dept: NEUROSURGERY | Facility: CLINIC | Age: 19
End: 2019-10-01

## 2019-10-01 NOTE — TELEPHONE ENCOUNTER
Spoke with pt's father. Pt has been scheduled to meet with specialists at [facility] next week and wishes to cancelled 10/15 follow-ups for the time being. He is aware that they may call back at any time to reestablish care. He v/u.

## 2019-10-10 ENCOUNTER — TELEPHONE (OUTPATIENT)
Dept: INTERNAL MEDICINE | Facility: CLINIC | Age: 19
End: 2019-10-10

## 2019-10-10 NOTE — TELEPHONE ENCOUNTER
----- Message from Marni Lim sent at 10/10/2019 10:01 AM CDT -----  Contact: Lore/ [place]/ [phone]  Please call Lore so she can update you on the plan of care on the patient.

## 2019-12-17 ENCOUNTER — TELEPHONE (OUTPATIENT)
Dept: ENDOCRINOLOGY | Facility: CLINIC | Age: 19
End: 2019-12-17

## 2019-12-17 ENCOUNTER — TELEPHONE (OUTPATIENT)
Dept: NEUROSURGERY | Facility: CLINIC | Age: 19
End: 2019-12-17

## 2019-12-17 DIAGNOSIS — D44.4 CRANIOPHARYNGIOMA: Primary | ICD-10-CM

## 2020-01-02 ENCOUNTER — TELEPHONE (OUTPATIENT)
Dept: ENDOCRINOLOGY | Facility: CLINIC | Age: 20
End: 2020-01-02

## 2020-01-03 ENCOUNTER — TELEPHONE (OUTPATIENT)
Dept: INTERNAL MEDICINE | Facility: CLINIC | Age: 20
End: 2020-01-03

## 2020-01-03 NOTE — TELEPHONE ENCOUNTER
Please notify father that DR Dangelo is out today. Can he wait until Monday when Dr Dangelo returns? I am not familiar with xander

## 2020-01-03 NOTE — TELEPHONE ENCOUNTER
----- Message from Rox Lee sent at 1/3/2020  2:09 PM CST -----  Contact: father; Pj    [phone]  Would like to discuss further treatment for June and a recommendation for an Endochrinologist. Patient father would like a call back today.

## 2020-01-06 NOTE — TELEPHONE ENCOUNTER
pls call - she should f/u with Dr Almonte - the endocrinologist who saw her with Dr Serrano.    Does she have an other questions?

## 2020-01-12 ENCOUNTER — PATIENT OUTREACH (OUTPATIENT)
Dept: ADMINISTRATIVE | Facility: OTHER | Age: 20
End: 2020-01-12

## 2020-01-14 ENCOUNTER — OFFICE VISIT (OUTPATIENT)
Dept: ENDOCRINOLOGY | Facility: CLINIC | Age: 20
End: 2020-01-14
Payer: COMMERCIAL

## 2020-01-14 VITALS
TEMPERATURE: 99 F | SYSTOLIC BLOOD PRESSURE: 98 MMHG | BODY MASS INDEX: 21.79 KG/M2 | HEART RATE: 77 BPM | HEIGHT: 55 IN | DIASTOLIC BLOOD PRESSURE: 62 MMHG | WEIGHT: 94.13 LBS

## 2020-01-14 DIAGNOSIS — E23.2 DIABETES INSIPIDUS: ICD-10-CM

## 2020-01-14 DIAGNOSIS — E28.39 FEMALE HYPOGONADISM: Primary | ICD-10-CM

## 2020-01-14 DIAGNOSIS — D44.4 CRANIOPHARYNGIOMA: ICD-10-CM

## 2020-01-14 DIAGNOSIS — E03.8 SECONDARY HYPOTHYROIDISM: ICD-10-CM

## 2020-01-14 PROCEDURE — 99999 PR PBB SHADOW E&M-EST. PATIENT-LVL III: ICD-10-PCS | Mod: PBBFAC,,, | Performed by: INTERNAL MEDICINE

## 2020-01-14 PROCEDURE — 99999 PR PBB SHADOW E&M-EST. PATIENT-LVL III: CPT | Mod: PBBFAC,,, | Performed by: INTERNAL MEDICINE

## 2020-01-14 PROCEDURE — 99204 PR OFFICE/OUTPT VISIT, NEW, LEVL IV, 45-59 MIN: ICD-10-PCS | Mod: S$GLB,,, | Performed by: INTERNAL MEDICINE

## 2020-01-14 PROCEDURE — 99204 OFFICE O/P NEW MOD 45 MIN: CPT | Mod: S$GLB,,, | Performed by: INTERNAL MEDICINE

## 2020-01-14 RX ORDER — DESMOPRESSIN ACETATE 0.1 MG/1
0.1 TABLET ORAL NIGHTLY
COMMUNITY
Start: 2019-12-20 | End: 2021-05-04

## 2020-01-14 RX ORDER — ESTRADIOL 0.03 MG/D
FILM, EXTENDED RELEASE TRANSDERMAL
Qty: 60 PATCH | Refills: 3 | Status: SHIPPED | OUTPATIENT
Start: 2020-01-14 | End: 2020-07-31

## 2020-01-14 RX ORDER — CHOLECALCIFEROL (VITAMIN D3) 25 MCG
1000 TABLET ORAL DAILY
COMMUNITY
End: 2021-06-01 | Stop reason: SDUPTHER

## 2020-01-14 RX ORDER — LEVOTHYROXINE SODIUM 125 UG/1
125 TABLET ORAL DAILY
COMMUNITY
Start: 2019-12-13 | End: 2020-10-05 | Stop reason: SDUPTHER

## 2020-01-14 NOTE — LETTER
January 21, 2020      Jung Serrano MD  [address]  [facility] [address]           [address]  [address]  [facility] [address]  Phone: [phone]  Fax: [phone]          Patient: June Li   MR Number: [id number]   YOB: 2000   Date of Visit: 1/14/2020       Dear Dr. Jung Serrano:    Thank you for referring June Li to me for evaluation. Attached you will find relevant portions of my assessment and plan of care.    If you have questions, please do not hesitate to call me. I look forward to following June Li along with you.    Sincerely,    Shandra Santamaria MD    Enclosure  CC:  No Recipients    If you would like to receive this communication electronically, please contact [email or web address] or [phone] to request more information on [employer] Link access.    For providers and/or their staff who would like to refer a patient to [facility], please contact us through our one-stop-shop provider referral line, [facility], at [phone].    If you feel you have received this communication in error or would no longer like to receive these types of communications, please e-mail [email or web address]

## 2020-01-14 NOTE — PROGRESS NOTES
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    She is accompanied by her father today who helps provide much of the hx.    HPI:   June Li is a 19 y.o. 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  She is being treated and followed at [facility] where she was treated w/ 30 rounds of proton beam starting 19.  She will continue follow up at [facility] and has planned upcoming MRI to monitor tumor growth.  I do not have outside records today.  She has had extensive laboratory workup at [facility]'s    Prior to proton beam she DI and central hypothyroidism.  She was started on LT4 and ddavp.    Central Hypothyroidism:  Currently taking LT4 125 mg 1/2 tab daily.  No symptoms of hypo/hyperthyroidism.    diabetes insipidus  On DDAVP 0.05 mg nightly.    Denies execess urination in day time.    No longer having nocturia.     hypogonadotropic hypogonadism   Did not go through puberty as a child.  At age 19 she was treated w/ OCP for a few months with start of menstrual bleeding and growth of breast tissue but she stopped it 2/2 increased thirst, was not on desmopressin at that time.  Currently not on estrogen    Initial presentation:   During childhood she stopped gaining height around age 9 while her twin sister had normal growth.  She was seen by a pediatric endocrinologist but per patient report growth plates were fused and she was not offered any treatment and hand no brain imaging.  She did not undergo menarche and had no development of breast tissue.  As an adult she established care with a new PCP who found prolactin to be elevated thus pituitary MRI was obtained showing 1.9 cm craniopharyngioma abutting the optic chiasm.    Imagin/9/19 - MRI Brain with the pt which shows a 1.93 cm (in its greatest extent) x 1.7 cm cystic tumor attached to the  pituitary stalk with features typical for a craniopharyngioma. The tumor abuts both the optic chiasm and the hypothalamus.    Headache:    denies  Vision change:   denies  Formal Visual fields:   9/17/19 normal HVF (scanned to media 9/26/19)      Lab Results   Component Value Date    PROLACTIN 46.8 (H) 08/16/2019    LABLH 3.6 05/28/2012    FSH 5.50 08/16/2019    FSH 5.8 05/28/2012    SOMATMDN 136 (L) 05/28/2012     08/01/2019    K 4.0 08/01/2019    CALCIUM 9.3 08/01/2019    ALBUMIN 3.6 08/01/2019    ESTGFRAFRICA >60.0 08/01/2019    EGFRNONAA >60.0 08/01/2019       Past Medical History:   Diagnosis Date    Hypothyroidism     Hypothyroidism 8/1/2019       No past surgical history on file.    Review of patient's allergies indicates:   Allergen Reactions    Sulfa (sulfonamide antibiotics) Swelling         Current Outpatient Medications:     desmopressin (DDAVP) 0.1 MG Tab, Take 0.1 mcg by mouth every evening. Take 1.2 tablet by mouth once each evening, Disp: , Rfl:     SYNTHROID 125 mcg tablet, Take 125 mcg by mouth once daily. Take half by mouth once daily, Disp: , Rfl:     vitamin D (VITAMIN D3) 1000 units Tab, Take 1,000 Units by mouth once daily., Disp: , Rfl:     Social History     Socioeconomic History    Marital status: Single     Spouse name: Not on file    Number of children: Not on file    Years of education: Not on file    Highest education level: Not on file   Occupational History    Occupation:    Social Needs    Financial resource strain: Not on file    Food insecurity:     Worry: Not on file     Inability: Not on file    Transportation needs:     Medical: Not on file     Non-medical: Not on file   Tobacco Use    Smoking status: Never Smoker    Smokeless tobacco: Never Used   Substance and Sexual Activity    Alcohol use: No     Comment: minimal    Drug use: No    Sexual activity: Never   Lifestyle    Physical activity:     Days per week: Not on file     Minutes per  "session: Not on file    Stress: Not on file   Relationships    Social connections:     Talks on phone: Not on file     Gets together: Not on file     Attends [employer] service: Not on file     Active member of club or organization: Not on file     Attends meetings of clubs or organizations: Not on file     Relationship status: Not on file   Other Topics Concern    Not on file   Social History Narrative    Exercise:  Gym 3 days a wek.       Family History   Problem Relation Age of Onset    No Known Problems Mother     No Known Problems Father     No Known Problems Sister     No Known Problems Brother        ROS:  14 point review of systems was reviewed.  Pertinent positives and negatives per HPI, all others negative    Objective:   Physical Exam   Ht 4' 7" (1.397 m)   Wt 42.7 kg (94 lb 2.2 oz)   BMI 21.88 kg/m²   Wt Readings from Last 3 Encounters:   01/14/20 42.7 kg (94 lb 2.2 oz)   09/13/19 40.9 kg (90 lb 3.2 oz)   08/01/19 39 kg (85 lb 15.7 oz)   ]    Constitutional:  Pleasant, short stature. in no acute distress.   HENT:   Head:    Normocephalic and atraumatic.   Mouth/Throat:   Oropharynx is clear and moist. No oropharyngeal exudate.   Eyes:    EOMI, VF in tact to confrontation. No scleral icterus.   Neck:    No thyromegaly or palpable thyroid nodules, no cervical LAD  Cardiovascular:  Normal rate, regular rhythm, no murmurs.  No carotid bruits.  Respiratory:   Effort normal and breath sounds normal.   Gastrointestinal: Soft, nontender  Neurological:  No tremor, normal speech  Skin:    Skin is warm, dry  Psych:   Normal mood and affect.   Extremity:  No edema or wounds, no deformity    LABORATORY REVIEW:    Chemistry        Component Value Date/Time     08/01/2019 0839    K 4.0 08/01/2019 0839     08/01/2019 0839    CO2 28 08/01/2019 0839    BUN 15 08/01/2019 0839    CREATININE 0.7 08/01/2019 0839    GLU 72 08/01/2019 0839        Component Value Date/Time    CALCIUM 9.3 08/01/2019 0839    " ALKPHOS 83 08/01/2019 0839    AST 22 08/01/2019 0839    ALT 13 08/01/2019 0839    BILITOT 0.2 08/01/2019 0839    ESTGFRAFRICA >60.0 08/01/2019 0839    EGFRNONAA >60.0 08/01/2019 0839          Lab Results   Component Value Date    PROLACTIN 46.8 (H) 08/16/2019    LABLH 3.6 05/28/2012    FSH 5.50 08/16/2019    FSH 5.8 05/28/2012    SOMATMDN 136 (L) 05/28/2012     08/01/2019    K 4.0 08/01/2019    CALCIUM 9.3 08/01/2019    ALBUMIN 3.6 08/01/2019    ESTGFRAFRICA >60.0 08/01/2019    EGFRNONAA >60.0 08/01/2019         Assessment/Plan:     Problem List Items Addressed This Visit        Renal/    Female hypogonadism - Primary     Did not go through puberty but was on OCP for a few months with some breast development.  Will start low dose estrogen patch and gradually increase before transitioning to OCP.           Relevant Medications    estradiol (VIVELLE-DOT) 0.025 mg/24 hr       Oncology    Craniopharyngioma     Will obtain labs and MRI CDs from [facility].  S/p treatment w/ proton beam.  Will also monitor for signs of new anterior pituitary hormone deficiency like adrenal insufficiency             Endocrine    Secondary hypothyroidism     On 1/2 tab of LT4 125 mcg daily with recent labs done at [facility].  Continue current dose and obtain records.         Diabetes insipidus     Symptoms controled on DDAVP, continue current dose.                 Return to clinic in 3 months    Shandra Santamaria MD

## 2020-01-14 NOTE — PATIENT INSTRUCTIONS
Please start estrogen patch applying twice a week.    Let me know about the progesterone.    Will see you 3 months    Bring physical copies [facility] records and CD with all MRIs.    Let me know if you start getting light headed, dizzy, low blood pressure, nausea, vomiting, or abdominal pain.      Thank you for enrolling in [facility]. Please follow the instructions below to securely access your online medical record. My allows you to send messages to your doctor, view your test results, renew your prescriptions, schedule appointments, and more.     How Do I Sign Up?  1. In your Internet browser, go to [email or web address].  2. In the lower right of the page, click the Activate Now link located under the Have Access Code? Title.  3. Enter your [facility] Access Code exactly as it appears below. You will not need to use this code after youve completed the sign-up process. If you do not sign up before the expiration date, you must request a new code.  [facility] Access Code: [email or web address]  Expires: 2/20/2020 12:28 PM    4. Enter Date of Birth (mm/dd/yyyy) as indicated and click the Next button. You will be taken to the next sign-up page.  5. Create a [facility] ID. This will be your new [facility] login ID and cannot be changed, so think of one that is secure and easy to remember.  6. Create a [facility] password.  Your password must be at least 8 characters long and contain at least 1 letter and 1 number.  You can change your password at any time.  7. Enter your Password Reset Question and Answer, then click the Next button.   8. Enter your e-mail address. You will receive e-mail notification when new information is available in [facility].  9. Click Sign Up. You can now view your medical record.     Additional Information  If you have questions, you can email [email or web address] or call [phone]  to talk to our [facility] staff. Remember, [facility] is NOT to be used for urgent needs. For medical emergencies,  dial 911.

## 2020-01-21 PROBLEM — E28.39 FEMALE HYPOGONADISM: Status: ACTIVE | Noted: 2020-01-21

## 2020-01-21 PROBLEM — E23.2 DIABETES INSIPIDUS: Status: ACTIVE | Noted: 2020-01-21

## 2020-01-21 PROBLEM — E03.8 SECONDARY HYPOTHYROIDISM: Status: ACTIVE | Noted: 2019-08-01

## 2020-01-21 PROBLEM — D44.4 CRANIOPHARYNGIOMA: Status: ACTIVE | Noted: 2020-01-21

## 2020-01-21 NOTE — ASSESSMENT & PLAN NOTE
Will obtain labs and MRI CDs from [facility].  S/p treatment w/ proton beam.  Will also monitor for signs of new anterior pituitary hormone deficiency like adrenal insufficiency

## 2020-01-21 NOTE — ASSESSMENT & PLAN NOTE
On 1/2 tab of LT4 125 mcg daily with recent labs done at [facility].  Continue current dose and obtain records.

## 2020-01-21 NOTE — ASSESSMENT & PLAN NOTE
Did not go through puberty but was on OCP for a few months with some breast development.  Will start low dose estrogen patch and gradually increase before transitioning to OCP.

## 2020-03-28 ENCOUNTER — NURSE TRIAGE (OUTPATIENT)
Dept: ADMINISTRATIVE | Facility: CLINIC | Age: 20
End: 2020-03-28

## 2020-03-28 NOTE — TELEPHONE ENCOUNTER
Reason for Disposition   [1] Cough occurs AND [2] within 14 days of COVID-19 EXPOSURE    Additional Information   Negative: Severe difficulty breathing (e.g., struggling for each breath, speak in single words, bluish lips)   Negative: Sounds like a life-threatening emergency to the triager   Negative: [1] Difficulty breathing occurs AND [2] within 14 days of COVID-19 EXPOSURE (Close Contact)   Negative: Patient sounds very sick or weak to the triager   Negative: [1] Fever or feeling feverish AND [2] within 14 Days of COVID-19 EXPOSURE (Close Contact)    Protocols used: CORONAVIRUS (COVID-19) EXPOSURE-A-AH    Exposed to positive covid19 and has a brain tumor. Cough and shortness of breath intermittently. Referred for anywhere care.

## 2020-03-28 NOTE — TELEPHONE ENCOUNTER
Spoke with June.    Reports fever, cough and body aches x3 days. Has tried some tylenol that has been helpful.  Denies: SOB, immune compromising condition, known sick contacts. Doesn't know anybody in her direct contacts that have tested positive or been tested for COVID 19.    Patient has symptoms concerning for possible COVID19 but no RF that warrant testing at this time. Sx could also be explained by flu or flu like illness. She is s/p flu shot and >48 hours since onset of symptoms and doesn't have hard indication for tamiflu.  Recommend that she self quarantine and recommend symptomatic relief.     Recs  Tylenol 1000mg TID PRN  OTC cough and cold medications  Check temp 2x,   Reach back out if symptoms worsening.    Akin Mckoy

## 2020-03-28 NOTE — TELEPHONE ENCOUNTER
Called to speak with the pt about his/her symptoms;    Fever in the last 24 hours? Yes highest 100.5  Been out of the country in the last 30 days? No   What countries have you been to?  What dates were you there?  When did you return, which day?  How long have you had symptoms for? 3 days   Been on a cruise ship or an airplane in the last 30 days? No   Has come in to contact with anyone that has any of the above, that he/she is aware of? No   Do you drive Uber, Lyft, ect.? No   Attended [employer] this season? Yes NO    Had any of the following symptoms;  Cough? Yes   Muscle Pain? Yes   S.O.B? Yes   Diarrhea? No   Rash? No   Emesis? No   Abdominal Pain? No   Red Eye? No   Weakness? Yes   Bruising or Bleeding? No   Joint Pain? Yes   Severe Headache? Yes on/off   Loss of taste/smell? No

## 2020-03-30 ENCOUNTER — TELEPHONE (OUTPATIENT)
Dept: INTERNAL MEDICINE | Facility: CLINIC | Age: 20
End: 2020-03-30

## 2020-03-30 NOTE — TELEPHONE ENCOUNTER
Achy Thursday Friday fever- 100.9  Bronchial cough.  Ears hurt when coughs and swallows.      99.8 this am.  Fatigued.  Appetite may be diminished.    Not sob.  Taking dayquil, nyquil and tylenol.      Please let me know if you have any questions.

## 2020-03-30 NOTE — TELEPHONE ENCOUNTER
----- Message from Renata Bentley sent at 3/30/2020 10:37 AM CDT -----  Needs Advice    Reason for call:--Still has same symptoms but getting worse and new symptoms ear pain,throat pain when cough--        Communication Preference:Jorge L--[phone]--    Additional Information:Mom calling to let the doctor know that pt symptoms are getting worse and she has new symptoms listed above as well. Please call to advise.

## 2020-04-03 ENCOUNTER — NURSE TRIAGE (OUTPATIENT)
Dept: ADMINISTRATIVE | Facility: CLINIC | Age: 20
End: 2020-04-03

## 2020-04-03 ENCOUNTER — TELEPHONE (OUTPATIENT)
Dept: INTERNAL MEDICINE | Facility: CLINIC | Age: 20
End: 2020-04-03

## 2020-04-03 DIAGNOSIS — R05.9 COUGH: Primary | ICD-10-CM

## 2020-04-03 RX ORDER — PROMETHAZINE HYDROCHLORIDE AND CODEINE PHOSPHATE 6.25; 1 MG/5ML; MG/5ML
5 SOLUTION ORAL EVERY 8 HOURS PRN
Qty: 100 ML | Refills: 0 | Status: SHIPPED | OUTPATIENT
Start: 2020-04-03 | End: 2020-04-13

## 2020-04-03 NOTE — TELEPHONE ENCOUNTER
----- Message from Aimee Juárez sent at 4/3/2020  9:35 AM CDT -----  Contact: Kathryn(mother) [phone]  Kathryn is requesting call regarding pt's condition. Please advise.

## 2020-04-03 NOTE — TELEPHONE ENCOUNTER
Kathryn was informed and stated that the the pt did not have a fever or any SOB. She is not sure if the pt has taken codeine cough syrup before. If the codeine is sent to the pharmacy Kathryn would like to know if the pt can continue taking the Dayquil with it.  Updated pharmacy.      Please advise,

## 2020-04-03 NOTE — TELEPHONE ENCOUNTER
Pt's mother accepted this call because pt is a little hoarse. She said pt will probably not take the DayQuil unless it is necessary. She will take the Phenergan w/Codeine at night before bed when the coughing is the worst.

## 2020-04-03 NOTE — TELEPHONE ENCOUNTER
Phenergan w codeine sent to pharmacy.   Can alternate with dayquil every 4 hours. Do not take codeine syrup more than every 8 hours.

## 2020-04-03 NOTE — TELEPHONE ENCOUNTER
Spoke with Kathryn she stated that the pt still has a cough x3days and she would like to know what to give her. She states that Dr. Dangelo advised her to try Dayquil but it is not working.       Please advise,

## 2020-04-04 NOTE — TELEPHONE ENCOUNTER
"Mom states pt has been diagnosed with the flu and she thought she was getting better until today, fever has diminished but now she states her throat feels "lumpy", mom states she can see little bumps in the back of her throat, pt states it does not hurt but its concerning them both, she can still swallow fine and no trouble breathing, advised her to do another anywhere care visit and go straight to ER with any trouble swallowing or breathing, caller agreed      Additional Information   Negative: [1] Difficulty breathing AND [2] severe (struggling for each breath, unable to cry or speak, stridor, severe retractions, etc)   Negative: Slow, shallow, weak breathing   Negative: [1] Drooling or spitting out saliva (because can't swallow) AND [2] any difficulty breathing   Negative: Sounds like a life-threatening emergency to the triager   Negative: [1] Diagnosed strep throat AND [2] taking antibiotic AND [3] symptoms continue   Negative: Throat culture results, calls about   Negative: Mononucleosis recently diagnosed   Negative: Tonsil and/or adenoid surgery in the last month   Negative: [1] Age < 2 years AND [2] swallowing difficulty   Negative: [1] Age < 2 years AND [2] fluid intake is decreased   Negative: Croup is main symptom   Negative: Cough is main symptom   Negative: Hoarseness is main symptom   Negative: Runny nose is the main symptom   Negative: [1] Stiff neck (can't touch chin to chest) AND [2] fever   Negative: Difficulty breathing (per caller) but not severe   Negative: [1] Drooling or spitting out saliva (because can't swallow) AND [2] normal breathing   Negative: [1] Drinking very little AND [2] signs of dehydration (no urine > 12 hours, very dry mouth, no tears, etc.)   Negative: [1] Can't move neck normally AND [2] fever   Negative: [1] Throat surgery within last week AND [2] minor bleeding   Negative: [1] Fever AND [2] > 105 F (40.6 C) by any route OR axillary > 104 F (40 C)   " Negative: [1] Fever AND [2] weak immune system (sickle cell disease, HIV, splenectomy, chemotherapy, organ transplant, chronic oral steroids, etc)   Negative: Child sounds very sick or weak to the triager   Negative: [1] Refuses to drink anything AND [2] for > 12 hours   Negative: [1] Can't move neck normally AND [2] no fever   Negative: [1] Age 6 years and older AND [2] complains they can't open mouth normally (without being asked)   Negative: Age < 2 years old   Negative: [1] Rash AND [2] widespread (especially chest and abdomen)(Exception: if purpura or petechiae, see now)   Negative: Sores present on the skin   Negative: [1] SEVERE throat pain (interferes with function) AND [2] not improved after 2 hours of ibuprofen AND [3] drinking adequately   Negative: Earache also present   Negative: [1] Age > 5 years AND [2] sinus pain (not just congestion) is also present   Negative: Fever present > 3 days (72 hours)   Negative: Symptoms sound compatible with strep to the triager (Exception: mild symptoms and child not too sick)   Negative: [1] Parent concerned about Strep AND [2] wants child examined (or throat looked at)   Negative: Parent requests an antibiotic  for sore throat   Negative: [1] Strep test only visit option is available AND [2] child with mild symptoms   Negative: [1] Positive throat culture or rapid strep test (according to lab, PCP, caller, etc) AND [2] NO standing order to call in prescription for antibiotic   Negative: [1] Exposure to family member with test-proven Strep AND [2] symptoms compatible with Strep   Negative: [1] Strep exposure within last 10 days AND [2] sore throat   Negative: [1] Sore throat is the only symptom AND [2] present > 48 hours   Negative: [1] Sore throat with cough/cold symptoms AND [2] present > 5 days   Negative: [1] Fever returns after gone for over 24 hours AND [2] symptoms worse or not improved   Negative: [1] Big lymph nodes in neck AND [2]  new-onset   Negative: [1] Caller requests Strep test only visit for mild symptoms AND [2] option NOT available   Negative: [1] Sore throat is the only symptom AND [2] present < 48 hours   Negative: [1] Sore throat occurs with cold/cough symptoms AND [2] present < 5 days   Negative: [1] Positive throat culture or rapid strep test (according to lab, PCP, caller, etc.) AND [2] standing order to call in prescription for antibiotic    Protocols used: SORE THROAT-P-AH

## 2020-04-07 ENCOUNTER — TELEPHONE (OUTPATIENT)
Dept: ENDOCRINOLOGY | Facility: CLINIC | Age: 20
End: 2020-04-07

## 2020-04-07 ENCOUNTER — TELEPHONE (OUTPATIENT)
Dept: INTERNAL MEDICINE | Facility: CLINIC | Age: 20
End: 2020-04-07

## 2020-04-07 RX ORDER — BENZONATATE 200 MG/1
200 CAPSULE ORAL 3 TIMES DAILY PRN
Qty: 30 CAPSULE | Refills: 0 | Status: SHIPPED | OUTPATIENT
Start: 2020-04-07 | End: 2020-04-17

## 2020-04-07 NOTE — TELEPHONE ENCOUNTER
"Mother states that the pt has the flu that is lingering. Throat got irritated and scheduled virtual vst dx strep.   Throat has gotten better / coughing (chronic) lymph nodes has swollen / sensitive to the touch "hurts" / moms question is this something that will get better with time or does this need to be treated / is the swelling associated with the strep?    Day 4 of antibiotics 2 weeks of being sick / seems like she is getting better / is the swelling associated with the strep?    Pt wants phone call visit / will do virtual if you believe its neccessary.   "

## 2020-04-07 NOTE — TELEPHONE ENCOUNTER
pls call - it will take several days for lymph node tenderness to improve.  Be patient.   I certainly can do a video visit if she'd like - but I don't think necessary.  She needs to sign up for MO so we could do in future, if needed.  thanks         DISPLAY PLAN FREE TEXT DISPLAY PLAN FREE TEXT DISPLAY PLAN FREE TEXT DISPLAY PLAN FREE TEXT DISPLAY PLAN FREE TEXT DISPLAY PLAN FREE TEXT DISPLAY PLAN FREE TEXT DISPLAY PLAN FREE TEXT DISPLAY PLAN FREE TEXT DISPLAY PLAN FREE TEXT

## 2020-04-07 NOTE — TELEPHONE ENCOUNTER
I will call in tessalon for cough   Yes, cough can linger for weeks.  Benadryl 25 mg at bedtime may help.    I'll be glad to listen to her lungs if she is concerned.

## 2020-04-07 NOTE — TELEPHONE ENCOUNTER
Left voicemail requesting call back. Pt will need to set up MyChart for virtual visit. Will also need to obtain PREETI to get MRIs and records from [facility]. PREETI ready pending pt signature.

## 2020-04-07 NOTE — TELEPHONE ENCOUNTER
Spoke with pts mom, advised of message below. Most of pts symptoms have improved. The main thing that has her mom concerned is her cough. I advised that the cough can linger for months. She says at night pt has these coughing spells that really cause her pain because of her tender lymph nodes.   Pt is takingTylenol, tea / honey, salt water treatments. She stopped taking promethazine codeine because it caused her throat to feel like it was swelling. Mom would like to know what pt can take to help with cough and if this is really something that can liger that long. Please advise

## 2020-04-07 NOTE — TELEPHONE ENCOUNTER
----- Message from Tara Hernandez sent at 4/7/2020  9:28 AM CDT -----  Contact: wife/johnna/[phone]  Pt mother called in regards to talking to the dr about the pt swollen glans in her neck/coughing. She would like a call back asap      Please advise

## 2020-04-14 ENCOUNTER — TELEPHONE (OUTPATIENT)
Dept: ENDOCRINOLOGY | Facility: CLINIC | Age: 20
End: 2020-04-14

## 2020-04-14 NOTE — TELEPHONE ENCOUNTER
Called pt, pt states she would like to cancel appt for today and she will reschedule through patient portal after downloading johnny

## 2020-07-31 ENCOUNTER — CLINICAL SUPPORT (OUTPATIENT)
Dept: INTERNAL MEDICINE | Facility: CLINIC | Age: 20
End: 2020-07-31
Payer: COMMERCIAL

## 2020-07-31 ENCOUNTER — OFFICE VISIT (OUTPATIENT)
Dept: INTERNAL MEDICINE | Facility: CLINIC | Age: 20
End: 2020-07-31
Payer: COMMERCIAL

## 2020-07-31 VITALS
WEIGHT: 99.63 LBS | DIASTOLIC BLOOD PRESSURE: 76 MMHG | BODY MASS INDEX: 23.06 KG/M2 | SYSTOLIC BLOOD PRESSURE: 112 MMHG | HEART RATE: 82 BPM | HEIGHT: 55 IN

## 2020-07-31 DIAGNOSIS — E03.8 SECONDARY HYPOTHYROIDISM: ICD-10-CM

## 2020-07-31 DIAGNOSIS — N91.1 AMENORRHEA, SECONDARY: ICD-10-CM

## 2020-07-31 DIAGNOSIS — Z00.00 ROUTINE GENERAL MEDICAL EXAMINATION AT A HEALTH CARE FACILITY: Primary | ICD-10-CM

## 2020-07-31 DIAGNOSIS — E23.2 DIABETES INSIPIDUS: ICD-10-CM

## 2020-07-31 DIAGNOSIS — D44.4 CRANIOPHARYNGIOMA: ICD-10-CM

## 2020-07-31 LAB
ALBUMIN SERPL BCP-MCNC: 3.5 G/DL (ref 3.5–5.2)
ALP SERPL-CCNC: 104 U/L (ref 55–135)
ALT SERPL W/O P-5'-P-CCNC: 23 U/L (ref 10–44)
ANION GAP SERPL CALC-SCNC: 7 MMOL/L (ref 8–16)
AST SERPL-CCNC: 25 U/L (ref 10–40)
BILIRUB SERPL-MCNC: 0.2 MG/DL (ref 0.1–1)
BUN SERPL-MCNC: 7 MG/DL (ref 6–20)
CALCIUM SERPL-MCNC: 9.2 MG/DL (ref 8.7–10.5)
CHLORIDE SERPL-SCNC: 103 MMOL/L (ref 95–110)
CHOLEST SERPL-MCNC: 183 MG/DL (ref 120–199)
CHOLEST/HDLC SERPL: 3.7 {RATIO} (ref 2–5)
CO2 SERPL-SCNC: 27 MMOL/L (ref 23–29)
CREAT SERPL-MCNC: 0.6 MG/DL (ref 0.5–1.4)
ERYTHROCYTE [DISTWIDTH] IN BLOOD BY AUTOMATED COUNT: 12 % (ref 11.5–14.5)
EST. GFR  (AFRICAN AMERICAN): >60 ML/MIN/1.73 M^2
EST. GFR  (NON AFRICAN AMERICAN): >60 ML/MIN/1.73 M^2
GLUCOSE SERPL-MCNC: 86 MG/DL (ref 70–110)
HCT VFR BLD AUTO: 40.8 % (ref 37–48.5)
HDLC SERPL-MCNC: 49 MG/DL (ref 40–75)
HDLC SERPL: 26.8 % (ref 20–50)
HGB BLD-MCNC: 13.6 G/DL (ref 12–16)
LDLC SERPL CALC-MCNC: 117.8 MG/DL (ref 63–159)
MCH RBC QN AUTO: 28.7 PG (ref 27–31)
MCHC RBC AUTO-ENTMCNC: 33.3 G/DL (ref 32–36)
MCV RBC AUTO: 86 FL (ref 82–98)
NONHDLC SERPL-MCNC: 134 MG/DL
PLATELET # BLD AUTO: 232 K/UL (ref 150–350)
PMV BLD AUTO: 9.3 FL (ref 9.2–12.9)
POTASSIUM SERPL-SCNC: 3.7 MMOL/L (ref 3.5–5.1)
PROT SERPL-MCNC: 7.5 G/DL (ref 6–8.4)
RBC # BLD AUTO: 4.74 M/UL (ref 4–5.4)
SODIUM SERPL-SCNC: 137 MMOL/L (ref 136–145)
TRIGL SERPL-MCNC: 81 MG/DL (ref 30–150)
WBC # BLD AUTO: 6.79 K/UL (ref 3.9–12.7)

## 2020-07-31 PROCEDURE — 99395 PREV VISIT EST AGE 18-39: CPT | Mod: S$GLB,,, | Performed by: INTERNAL MEDICINE

## 2020-07-31 PROCEDURE — 80061 LIPID PANEL: CPT

## 2020-07-31 PROCEDURE — 36415 COLL VENOUS BLD VENIPUNCTURE: CPT

## 2020-07-31 PROCEDURE — 99999 PR PBB SHADOW E&M-EST. PATIENT-LVL III: ICD-10-PCS | Mod: PBBFAC,,, | Performed by: INTERNAL MEDICINE

## 2020-07-31 PROCEDURE — 80053 COMPREHEN METABOLIC PANEL: CPT

## 2020-07-31 PROCEDURE — 85027 COMPLETE CBC AUTOMATED: CPT

## 2020-07-31 PROCEDURE — 99395 PR PREVENTIVE VISIT,EST,18-39: ICD-10-PCS | Mod: S$GLB,,, | Performed by: INTERNAL MEDICINE

## 2020-07-31 PROCEDURE — 99999 PR PBB SHADOW E&M-EST. PATIENT-LVL III: CPT | Mod: PBBFAC,,, | Performed by: INTERNAL MEDICINE

## 2020-07-31 NOTE — PROGRESS NOTES
Subjective:       Patient ID: June Li is a 20 y.o. female.    Chief Complaint: Executive Health    HPI   Returning to [facility] in sept for f/u craniopharyngioma.  Completed radiation therapy.    Had menses twice thereafter, but last period was 45 days ago.       Less thirsty.  Taking DDAVP.     [facility] [place].  To be  in Feb.      Review of Systems   Constitutional: Negative for fever and unexpected weight change.   HENT: Negative for nasal congestion and postnasal drip.    Eyes: Negative for pain, discharge and visual disturbance.   Respiratory: Negative for cough, chest tightness, shortness of breath and wheezing.    Cardiovascular: Negative for chest pain and leg swelling.   Gastrointestinal: Negative for abdominal pain, constipation, diarrhea and nausea.   Genitourinary: Negative for difficulty urinating, dysuria and hematuria.   Integumentary:  Negative for rash.   Neurological: Negative for headaches.   Psychiatric/Behavioral: Negative for dysphoric mood and sleep disturbance. The patient is not nervous/anxious.          Objective:      Physical Exam  Constitutional:       Appearance: She is well-developed.   Eyes:      General: No scleral icterus.  Neck:      Thyroid: No thyromegaly.      Vascular: No JVD.   Cardiovascular:      Rate and Rhythm: Normal rate and regular rhythm.      Heart sounds: Normal heart sounds.   Pulmonary:      Effort: Pulmonary effort is normal. No respiratory distress.      Breath sounds: Normal breath sounds. No wheezing or rales.   Abdominal:      Palpations: Abdomen is soft. There is no mass.      Tenderness: There is no abdominal tenderness.   Neurological:      Mental Status: She is alert and oriented to person, place, and time.   Psychiatric:         Behavior: Behavior normal.         Results for orders placed or performed in visit on 07/31/20   Comprehensive metabolic panel   Result Value Ref Range    Sodium 137 136 - 145 mmol/L    Potassium 3.7 3.5 - 5.1 mmol/L     Chloride 103 95 - 110 mmol/L    CO2 27 23 - 29 mmol/L    Glucose 86 70 - 110 mg/dL    BUN, Bld 7 6 - 20 mg/dL    Creatinine 0.6 0.5 - 1.4 mg/dL    Calcium 9.2 8.7 - 10.5 mg/dL    Total Protein 7.5 6.0 - 8.4 g/dL    Albumin 3.5 3.5 - 5.2 g/dL    Total Bilirubin 0.2 0.1 - 1.0 mg/dL    Alkaline Phosphatase 104 55 - 135 U/L    AST 25 10 - 40 U/L    ALT 23 10 - 44 U/L    Anion Gap 7 (L) 8 - 16 mmol/L    eGFR if African American >60.0 >60 mL/min/1.73 m^2    eGFR if non African American >60.0 >60 mL/min/1.73 m^2   CBC Without Differential   Result Value Ref Range    WBC 6.79 3.90 - 12.70 K/uL    RBC 4.74 4.00 - 5.40 M/uL    Hemoglobin 13.6 12.0 - 16.0 g/dL    Hematocrit 40.8 37.0 - 48.5 %    Mean Corpuscular Volume 86 82 - 98 fL    Mean Corpuscular Hemoglobin 28.7 27.0 - 31.0 pg    Mean Corpuscular Hemoglobin Conc 33.3 32.0 - 36.0 g/dL    RDW 12.0 11.5 - 14.5 %    Platelets 232 150 - 350 K/uL    MPV 9.3 9.2 - 12.9 fL   Lipid panel   Result Value Ref Range    Cholesterol 183 120 - 199 mg/dL    Triglycerides 81 30 - 150 mg/dL    HDL 49 40 - 75 mg/dL    LDL Cholesterol 117.8 63.0 - 159.0 mg/dL    Hdl/Cholesterol Ratio 26.8 20.0 - 50.0 %    Total Cholesterol/HDL Ratio 3.7 2.0 - 5.0    Non-HDL Cholesterol 134 mg/dL     Assessment:       1. Routine general medical examination at a health care facility    2. Craniopharyngioma    3. Secondary hypothyroidism    4. Diabetes insipidus    5. Amenorrhea, secondary               - improved.  Plan:       June CAMACHO was seen today for [facility].    Diagnoses and all orders for this visit:    Routine general medical examination at a health care facility    Craniopharyngioma    Secondary hypothyroidism    Diabetes insipidus    Amenorrhea, secondary         HPV vaccine rec'd, as well as gyn exam..

## 2020-07-31 NOTE — LETTER
August 14, 2020    June Li  [address] [address]             [facility]  [address]  [facility] [address]  Phone: [phone]  Fax: [phone] Dear Ms. Li:    Thank you for allowing me to serve you and perform your Executive Health exam on 7/31/2020.  This letter will serve a brief summary of the history, physical findings, and laboratory/studies performed and recommendations at that time.    Reason for Visit: Executive Health Preventive Physical Examination    Past Medical History:   Diagnosis Date    Hypothyroidism     Hypothyroidism 8/1/2019       History reviewed. No pertinent surgical history.    Family History   Problem Relation Age of Onset    No Known Problems Mother     No Known Problems Father     No Known Problems Sister     No Known Problems Brother        Social History     Socioeconomic History    Marital status: Single     Spouse name: Not on file    Number of children: Not on file    Years of education: Not on file    Highest education level: Not on file   Occupational History    Occupation:    Social Needs    Financial resource strain: Not on file    Food insecurity     Worry: Not on file     Inability: Not on file    Transportation needs     Medical: Not on file     Non-medical: Not on file   Tobacco Use    Smoking status: Never Smoker    Smokeless tobacco: Never Used   Substance and Sexual Activity    Alcohol use: No     Comment: minimal    Drug use: No    Sexual activity: Never   Lifestyle    Physical activity     Days per week: Not on file     Minutes per session: Not on file    Stress: Not on file   Relationships    Social connections     Talks on phone: Not on file     Gets together: Not on file     Attends [employer] service: Not on file     Active member of club or organization: Not on file     Attends meetings of clubs or organizations: Not on file     Relationship status: Not on file    Other Topics Concern    Not on file   Social History Narrative    Exercise:  Gym 3 days a week and works out at home.        Review of patient's allergies indicates:   Allergen Reactions    Sulfa (sulfonamide antibiotics) Swelling         Current Outpatient Medications:     desmopressin (DDAVP) 0.1 MG Tab, Take 0.1 mcg by mouth every evening. Take 1.2 tablet by mouth once each evening, Disp: , Rfl:     SYNTHROID 125 mcg tablet, Take 125 mcg by mouth once daily. Take half by mouth once daily, Disp: , Rfl:     vitamin D (VITAMIN D3) 1000 units Tab, Take 1,000 Units by mouth once daily., Disp: , Rfl:      Review of Systems  Review of Systems - You tolerated radiotherapy for treatment of craniopharyngioma at [facility] quite well.  You complained of menstrual irregularity following restarting of menses after radiotherapy.   You are less thirsty since starting DDAVP..    Physical Exam:  General: General appearance: alert, well appearing, and in no distress.   Skin: Skin exam - normal coloration and turgor, no rashes, no suspicious skin lesions noted.  HEENT: Ears - bilateral TM's and external ear canals normal. , ENT exam reveals - ENT exam normal, no neck nodes or sinus tenderness.   Lungs: Chest: clear to auscultation, no wheezes, rales or rhonchi, symmetric air entry.   Heart: CVS exam: normal rate, regular rhythm, normal S1, S2, no murmurs, rubs, clicks or gallops.   Extremities: Exam of extremities: peripheral pulses normal, no pedal edema, no clubbing or cyanosis    Labs:  Results for orders placed or performed in visit on 07/31/20   Comprehensive metabolic panel   Result Value Ref Range    Sodium 137 136 - 145 mmol/L    Potassium 3.7 3.5 - 5.1 mmol/L    Chloride 103 95 - 110 mmol/L    CO2 27 23 - 29 mmol/L    Glucose 86 70 - 110 mg/dL    BUN, Bld 7 6 - 20 mg/dL    Creatinine 0.6 0.5 - 1.4 mg/dL    Calcium 9.2 8.7 - 10.5 mg/dL    Total Protein 7.5 6.0 - 8.4 g/dL    Albumin 3.5 3.5 - 5.2 g/dL    Total  Bilirubin 0.2 0.1 - 1.0 mg/dL    Alkaline Phosphatase 104 55 - 135 U/L    AST 25 10 - 40 U/L    ALT 23 10 - 44 U/L    Anion Gap 7 (L) 8 - 16 mmol/L    eGFR if African American >60.0 >60 mL/min/1.73 m^2    eGFR if non African American >60.0 >60 mL/min/1.73 m^2   CBC Without Differential   Result Value Ref Range    WBC 6.79 3.90 - 12.70 K/uL    RBC 4.74 4.00 - 5.40 M/uL    Hemoglobin 13.6 12.0 - 16.0 g/dL    Hematocrit 40.8 37.0 - 48.5 %    Mean Corpuscular Volume 86 82 - 98 fL    Mean Corpuscular Hemoglobin 28.7 27.0 - 31.0 pg    Mean Corpuscular Hemoglobin Conc 33.3 32.0 - 36.0 g/dL    RDW 12.0 11.5 - 14.5 %    Platelets 232 150 - 350 K/uL    MPV 9.3 9.2 - 12.9 fL   Lipid panel   Result Value Ref Range    Cholesterol 183 120 - 199 mg/dL    Triglycerides 81 30 - 150 mg/dL    HDL 49 40 - 75 mg/dL    LDL Cholesterol 117.8 63.0 - 159.0 mg/dL    Hdl/Cholesterol Ratio 26.8 20.0 - 50.0 %    Total Cholesterol/HDL Ratio 3.7 2.0 - 5.0    Non-HDL Cholesterol 134 mg/dL        Assessment/Recommendations:  Routine Health Maintenance    At this time, you appear to be in good medical condition. Blood work was normal.  We discussed that missed period might be stress related.     You were encouraged to get the HPV vaccine series and to see a gynecologist.       I look forward to seeing you again next year.  Please contact me should you have any questions or concerns regarding physical findings, or my recommendations.    If you have any questions or concerns, please don't hesitate to call.    Sincerely,    Leda Dangelo MD

## 2020-10-01 ENCOUNTER — TELEPHONE (OUTPATIENT)
Dept: ENDOCRINOLOGY | Facility: CLINIC | Age: 20
End: 2020-10-01

## 2020-10-01 NOTE — TELEPHONE ENCOUNTER
----- Message from Cata Taveras sent at 10/1/2020  8:51 AM CDT -----  Regarding: PT  Contact: PT's Mother Kathryn  PT's mother called to make an appointment for the PT. The PT made an appointment with Dr Viveros for February but she needs to be seen sooner than that. The PT has craniopharyngioma and goes to [facility] to be treated and her mother was trying to get her seen before their next trip out there. Wanted to know if there was any way she could be seen this month on the 15th possibly or some other time. Please call back     Callback: [phone]

## 2020-10-04 ENCOUNTER — PATIENT OUTREACH (OUTPATIENT)
Dept: ADMINISTRATIVE | Facility: OTHER | Age: 20
End: 2020-10-04

## 2020-10-05 ENCOUNTER — OFFICE VISIT (OUTPATIENT)
Dept: ENDOCRINOLOGY | Facility: CLINIC | Age: 20
End: 2020-10-05
Payer: COMMERCIAL

## 2020-10-05 ENCOUNTER — PATIENT MESSAGE (OUTPATIENT)
Dept: ADMINISTRATIVE | Facility: HOSPITAL | Age: 20
End: 2020-10-05

## 2020-10-05 ENCOUNTER — LAB VISIT (OUTPATIENT)
Dept: LAB | Facility: HOSPITAL | Age: 20
End: 2020-10-05
Payer: COMMERCIAL

## 2020-10-05 VITALS
BODY MASS INDEX: 22.75 KG/M2 | HEIGHT: 55 IN | WEIGHT: 98.31 LBS | SYSTOLIC BLOOD PRESSURE: 112 MMHG | RESPIRATION RATE: 16 BRPM | DIASTOLIC BLOOD PRESSURE: 80 MMHG | HEART RATE: 85 BPM

## 2020-10-05 DIAGNOSIS — E23.2 DIABETES INSIPIDUS: Primary | ICD-10-CM

## 2020-10-05 DIAGNOSIS — E03.8 SECONDARY HYPOTHYROIDISM: ICD-10-CM

## 2020-10-05 DIAGNOSIS — E28.39 FEMALE HYPOGONADISM: ICD-10-CM

## 2020-10-05 DIAGNOSIS — D44.4 CRANIOPHARYNGIOMA: ICD-10-CM

## 2020-10-05 DIAGNOSIS — E23.2 DIABETES INSIPIDUS: ICD-10-CM

## 2020-10-05 LAB
ANION GAP SERPL CALC-SCNC: 11 MMOL/L (ref 8–16)
BUN SERPL-MCNC: 9 MG/DL (ref 6–20)
CALCIUM SERPL-MCNC: 10.1 MG/DL (ref 8.7–10.5)
CHLORIDE SERPL-SCNC: 101 MMOL/L (ref 95–110)
CO2 SERPL-SCNC: 24 MMOL/L (ref 23–29)
CREAT SERPL-MCNC: 0.7 MG/DL (ref 0.5–1.4)
EST. GFR  (AFRICAN AMERICAN): >60 ML/MIN/1.73 M^2
EST. GFR  (NON AFRICAN AMERICAN): >60 ML/MIN/1.73 M^2
GLUCOSE SERPL-MCNC: 79 MG/DL (ref 70–110)
POTASSIUM SERPL-SCNC: 3.8 MMOL/L (ref 3.5–5.1)
SODIUM SERPL-SCNC: 136 MMOL/L (ref 136–145)

## 2020-10-05 PROCEDURE — 36415 COLL VENOUS BLD VENIPUNCTURE: CPT

## 2020-10-05 PROCEDURE — 99214 PR OFFICE/OUTPT VISIT, EST, LEVL IV, 30-39 MIN: ICD-10-PCS | Mod: S$GLB,,, | Performed by: INTERNAL MEDICINE

## 2020-10-05 PROCEDURE — 99214 OFFICE O/P EST MOD 30 MIN: CPT | Mod: S$GLB,,, | Performed by: INTERNAL MEDICINE

## 2020-10-05 PROCEDURE — 99999 PR PBB SHADOW E&M-EST. PATIENT-LVL IV: ICD-10-PCS | Mod: PBBFAC,,, | Performed by: INTERNAL MEDICINE

## 2020-10-05 PROCEDURE — 99999 PR PBB SHADOW E&M-EST. PATIENT-LVL IV: CPT | Mod: PBBFAC,,, | Performed by: INTERNAL MEDICINE

## 2020-10-05 PROCEDURE — 80048 BASIC METABOLIC PNL TOTAL CA: CPT

## 2020-10-05 RX ORDER — LEVOTHYROXINE SODIUM 125 UG/1
62.5 TABLET ORAL DAILY
Start: 2020-10-05 | End: 2021-01-07 | Stop reason: SDUPTHER

## 2020-10-05 NOTE — PROGRESS NOTES
[facility] ENDOCRINOLOGY INITIAL VISIT  10/05/2020     The patient's last visit with me was on 1/14/2020.     Subjective:      Reason for referal: referred by No ref. provider found for evaluation and management of craniopharyngioma.    She is accompanied by her father today who helps provide much of the hx.    HPI:   June Li is a 20 y.o. 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  She is being treated and followed at [facility] where she was treated w/ 30 rounds of proton beam starting 11/11/19.  She will continue follow up at [facility] and has planned upcoming MRI to monitor tumor growth.  I do not have outside records today.  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  She was started on LT4 and ddavp.    She has follow-up at [facility] later this month.  At her last visit, we prescribed bivalve dot estrogen replacement.    Central Hypothyroidism:  Currently taking LT4 125 mg 1/2 tab daily.  No symptoms of hypo/hyperthyroidism.    diabetes insipidus  On DDAVP 0.05 mg nightly.    Denies execess urination in day time.    No longer having nocturia.     hypogonadotropic hypogonadism   Did not go through puberty as a child.  At age 19 she was treated w/ OCP for a few months with start of menstrual bleeding and growth of breast tissue but she stopped it 2/2 increased thirst, was not on desmopressin at that time.  Currently not on estrogen    Initial presentation:   During childhood she stopped gaining height around age 9 while her twin sister had normal growth.  She was seen by a pediatric endocrinologist but per patient report growth plates were fused and she was not offered any treatment and hand no brain imaging.  She did not undergo menarche and had no development of breast tissue.  As an adult she established care with a new PCP who found prolactin to be elevated thus pituitary MRI was obtained showing 1.9 cm  craniopharyngioma abutting the optic chiasm.    Imagin/9/19 - MRI Brain with the pt which shows a 1.93 cm (in its greatest extent) x 1.7 cm cystic tumor attached to the pituitary stalk with features typical for a craniopharyngioma. The tumor abuts both the optic chiasm and the hypothalamus.    Headache:    denies  Vision change:   denies  Formal Visual fields:   19 normal HVF (scanned to media 19)      Lab Results   Component Value Date    PROLACTIN 46.8 (H) 2019    LABLH 3.6 2012    FSH 5.50 2019    FSH 5.8 2012    SOMATMDN 136 (L) 2012     2020     2019    K 3.7 2020    K 4.0 2019    CALCIUM 9.2 2020    CALCIUM 9.3 2019    ALBUMIN 3.5 2020    ALBUMIN 3.6 2019    ESTGFRAFRICA >60.0 2020    ESTGFRAFRICA >60.0 2019    EGFRNONAA >60.0 2020    EGFRNONAA >60.0 2019       Past Medical History:   Diagnosis Date    Hypothyroidism     Hypothyroidism 2019       History reviewed. No pertinent surgical history.    Review of patient's allergies indicates:   Allergen Reactions    Sulfa (sulfonamide antibiotics) Swelling         Current Outpatient Medications:     desmopressin (DDAVP) 0.1 MG Tab, Take 0.1 mcg by mouth every evening. Take 1.2 tablet by mouth once each evening, Disp: , Rfl:     SYNTHROID 125 mcg tablet, Take 125 mcg by mouth once daily. Take half by mouth once daily, Disp: , Rfl:     vitamin D (VITAMIN D3) 1000 units Tab, Take 1,000 Units by mouth once daily., Disp: , Rfl:     Social History     Socioeconomic History    Marital status: Single     Spouse name: Not on file    Number of children: Not on file    Years of education: Not on file    Highest education level: Not on file   Occupational History    Occupation:    Social Needs    Financial resource strain: Not on file    Food insecurity     Worry: Not on file     Inability: Not on file     "Transportation needs     Medical: Not on file     Non-medical: Not on file   Tobacco Use    Smoking status: Never Smoker    Smokeless tobacco: Never Used   Substance and Sexual Activity    Alcohol use: No     Comment: minimal    Drug use: No    Sexual activity: Never   Lifestyle    Physical activity     Days per week: Not on file     Minutes per session: Not on file    Stress: Not on file   Relationships    Social connections     Talks on phone: Not on file     Gets together: Not on file     Attends Latter-day service: Not on file     Active member of club or organization: Not on file     Attends meetings of clubs or organizations: Not on file     Relationship status: Not on file   Other Topics Concern    Not on file   Social History Narrative    Exercise:  Gym 3 days a week and works out at home.       Family History   Problem Relation Age of Onset    No Known Problems Mother     No Known Problems Father     No Known Problems Sister     No Known Problems Brother        ROS:  14 point review of systems was reviewed.  Pertinent positives and negatives per HPI, all others negative    Objective:   Physical Exam   /80 (BP Location: Right arm, Patient Position: Sitting, BP Method: Medium (Manual))   Pulse 85   Resp 16   Ht 4' 7" (1.397 m)   Wt 44.6 kg (98 lb 5.2 oz)   BMI 22.85 kg/m²   Wt Readings from Last 3 Encounters:   10/05/20 44.6 kg (98 lb 5.2 oz)   07/31/20 45.2 kg (99 lb 10.4 oz)   01/14/20 42.7 kg (94 lb 2.2 oz)   ]    Constitutional:  Pleasant, short stature. in no acute distress.   HENT:   Head:    Normocephalic and atraumatic.   Mouth/Throat:   Oropharynx is clear and moist. No oropharyngeal exudate.   Eyes:    EOMI, VF in tact to confrontation. No scleral icterus.   Neck:    No thyromegaly or palpable thyroid nodules, no cervical LAD  Cardiovascular:  Normal rate, regular rhythm, no murmurs.  No carotid bruits.  Respiratory:   Effort normal and breath sounds normal. "   Gastrointestinal: Soft, nontender  Neurological:  No tremor, normal speech  Skin:    Skin is warm, dry  Psych:   Normal mood and affect.   Extremity:  No edema or wounds, no deformity    LABORATORY REVIEW:    Chemistry        Component Value Date/Time     07/31/2020 0847    K 3.7 07/31/2020 0847     07/31/2020 0847    CO2 27 07/31/2020 0847    BUN 7 07/31/2020 0847    CREATININE 0.6 07/31/2020 0847    GLU 86 07/31/2020 0847        Component Value Date/Time    CALCIUM 9.2 07/31/2020 0847    ALKPHOS 104 07/31/2020 0847    AST 25 07/31/2020 0847    ALT 23 07/31/2020 0847    BILITOT 0.2 07/31/2020 0847    ESTGFRAFRICA >60.0 07/31/2020 0847    EGFRNONAA >60.0 07/31/2020 0847          Lab Results   Component Value Date    PROLACTIN 46.8 (H) 08/16/2019    LABLH 3.6 05/28/2012    FSH 5.50 08/16/2019    FSH 5.8 05/28/2012    SOMATMDN 136 (L) 05/28/2012     07/31/2020     08/01/2019    K 3.7 07/31/2020    K 4.0 08/01/2019    CALCIUM 9.2 07/31/2020    CALCIUM 9.3 08/01/2019    ALBUMIN 3.5 07/31/2020    ALBUMIN 3.6 08/01/2019    ESTGFRAFRICA >60.0 07/31/2020    ESTGFRAFRICA >60.0 08/01/2019    EGFRNONAA >60.0 07/31/2020    EGFRNONAA >60.0 08/01/2019         Assessment/Plan:     Problem List Items Addressed This Visit     None          Return to clinic in 3 months    Shandra Santamaria MD

## 2020-10-05 NOTE — PATIENT INSTRUCTIONS
Will check sodium today.  Okay to stay of ddavp if sodium normal and no excess urination overnight.      Continue with 1/2 tablet levothyroxine 125 mcg daily.      Let me know if you get symptoms of unexpected weight loss, nausea, vomiting, lightheadedness. Extreme fatigue.      Hold off on estrogen for now.      Send me MRI CD

## 2020-10-13 NOTE — PROGRESS NOTES
PITUITARY CLIN ENDOCRINOLOGY FOLLOW UP  10/05/2020     The patient's last visit with me was on 1/14/2020.     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    She is accompanied by her mother today who helps provide some of the history.    HPI:   June Li is a 20 y.o. 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  She is being treated and followed at [facility] where she was treated w/ 30 rounds of proton beam starting 11/11/19.  Prior to proton beam she DI and central hypothyroidism.  She was started on LT4 and ddavp.  She will continue follow up at [facility] and was seen last month with labs and follow-up MRI.  I do not have copies of the MRI but mom will mail them to me for review.  Per patient her mom, there was possibly a slight decrease in the size of her craniopharyngioma but no significant change.    Labs drawn [facility] last month are notable for normal fT4 and am cortisol, low IGF-1, and elevated PRL.        denies symptoms of adrenal insufficiency (no lightheadedness, N/V/abd pain, hypotension).       Central Hypothyroidism:  Currently taking LT4 125 mg 1/2 tab daily.  No symptoms of hypo/hyperthyroidism.  Normal fT4 9/2020    diabetes insipidus  Was on DDAVP 0.05 mg nightly but felt this caused bloating so stopped taking it about a week ago w/o polyuria.  No longer having nocturia.     hypogonadotropic hypogonadism   Did not go through puberty as a child.  At age 19 she was treated w/ OCP for a few months with start of menstrual bleeding and growth of breast tissue but she stopped it 2/2 increased thirst, was not on desmopressin at that time.  Currently not on estrogen.  At her last visit, we prescribed vivelle dot estrogen replacement however she did not start it as she was advised that menses may resume after proton beam.  She has had 2 episodes of  spotting over the past 6 mo.    Hx:  Initial presentation:   During childhood she stopped gaining height around age 9 while her twin sister had normal growth.  She was seen by a pediatric endocrinologist but per patient report growth plates were fused and she was not offered any treatment and hand no brain imaging.  She did not undergo menarche and had no development of breast tissue.  As an adult she established care with a new PCP who found prolactin to be elevated thus pituitary MRI was obtained showing 1.9 cm craniopharyngioma abutting the optic chiasm.    Imagin/9/19 - MRI Brain with the pt which shows a 1.93 cm (in its greatest extent) x 1.7 cm cystic tumor attached to the pituitary stalk with features typical for a craniopharyngioma. The tumor abuts both the optic chiasm and the hypothalamus.    Headache:    denies  Vision change:   denies  Formal Visual fields:   19 normal HVF (scanned to media 19)      Lab Results   Component Value Date    PROLACTIN 46.8 (H) 2019    LABLH 3.6 2012    FSH 5.50 2019    FSH 5.8 2012    SOMATMDN 136 (L) 2012     2020     2019    K 3.7 2020    K 4.0 2019    CALCIUM 9.2 2020    CALCIUM 9.3 2019    ALBUMIN 3.5 2020    ALBUMIN 3.6 2019    ESTGFRAFRICA >60.0 2020    ESTGFRAFRICA >60.0 2019    EGFRNONAA >60.0 2020    EGFRNONAA >60.0 2019       Past Medical History:   Diagnosis Date    Hypothyroidism     Hypothyroidism 2019       History reviewed. No pertinent surgical history.    Review of patient's allergies indicates:   Allergen Reactions    Sulfa (sulfonamide antibiotics) Swelling         Current Outpatient Medications:     desmopressin (DDAVP) 0.1 MG Tab, Take 0.1 mcg by mouth every evening. Take 1.2 tablet by mouth once each evening, Disp: , Rfl:     SYNTHROID 125 mcg tablet, Take 125 mcg by mouth once daily. Take half by mouth once  "daily, Disp: , Rfl:     vitamin D (VITAMIN D3) 1000 units Tab, Take 1,000 Units by mouth once daily., Disp: , Rfl:     Social History     Socioeconomic History    Marital status: Single     Spouse name: Not on file    Number of children: Not on file    Years of education: Not on file    Highest education level: Not on file   Occupational History    Occupation:    Social Needs    Financial resource strain: Not on file    Food insecurity     Worry: Not on file     Inability: Not on file    Transportation needs     Medical: Not on file     Non-medical: Not on file   Tobacco Use    Smoking status: Never Smoker    Smokeless tobacco: Never Used   Substance and Sexual Activity    Alcohol use: No     Comment: minimal    Drug use: No    Sexual activity: Never   Lifestyle    Physical activity     Days per week: Not on file     Minutes per session: Not on file    Stress: Not on file   Relationships    Social connections     Talks on phone: Not on file     Gets together: Not on file     Attends [employer] service: Not on file     Active member of club or organization: Not on file     Attends meetings of clubs or organizations: Not on file     Relationship status: Not on file   Other Topics Concern    Not on file   Social History Narrative    Exercise:  Gym 3 days a week and works out at home.       Family History   Problem Relation Age of Onset    No Known Problems Mother     No Known Problems Father     No Known Problems Sister     No Known Problems Brother        ROS:  14 point review of systems was reviewed.  Pertinent positives and negatives per HPI, all others negative    Objective:   Physical Exam   /80 (BP Location: Right arm, Patient Position: Sitting, BP Method: Medium (Manual))   Pulse 85   Resp 16   Ht 4' 7" (1.397 m)   Wt 44.6 kg (98 lb 5.2 oz)   BMI 22.85 kg/m²   Wt Readings from Last 3 Encounters:   10/05/20 44.6 kg (98 lb 5.2 oz)   07/31/20 45.2 kg (99 lb 10.4 oz) "   01/14/20 42.7 kg (94 lb 2.2 oz)   ]    Constitutional:  Pleasant,  in no acute distress.   HENT:   Eyes:     No scleral icterus.   Respiratory:   Effort normal   Neurological:  normal speech  Psych:   Normal mood and affect.      LABORATORY REVIEW:  8/8/2020:   IGF-1 92 (Z-score is -2.9)   a.m. cortisol 9.2   estradiol 27.2   FSH 5.7  LH 4.29  prolactin 34.5  TSH is 0.037  free T4 1.5 (1-2.1)      Chemistry        Component Value Date/Time     07/31/2020 0847    K 3.7 07/31/2020 0847     07/31/2020 0847    CO2 27 07/31/2020 0847    BUN 7 07/31/2020 0847    CREATININE 0.6 07/31/2020 0847    GLU 86 07/31/2020 0847        Component Value Date/Time    CALCIUM 9.2 07/31/2020 0847    ALKPHOS 104 07/31/2020 0847    AST 25 07/31/2020 0847    ALT 23 07/31/2020 0847    BILITOT 0.2 07/31/2020 0847    ESTGFRAFRICA >60.0 07/31/2020 0847    EGFRNONAA >60.0 07/31/2020 0847          Lab Results   Component Value Date    PROLACTIN 46.8 (H) 08/16/2019    LABLH 3.6 05/28/2012    FSH 5.50 08/16/2019    FSH 5.8 05/28/2012    SOMATMDN 136 (L) 05/28/2012     07/31/2020     08/01/2019    K 3.7 07/31/2020    K 4.0 08/01/2019    CALCIUM 9.2 07/31/2020    CALCIUM 9.3 08/01/2019    ALBUMIN 3.5 07/31/2020    ALBUMIN 3.6 08/01/2019    ESTGFRAFRICA >60.0 07/31/2020    ESTGFRAFRICA >60.0 08/01/2019    EGFRNONAA >60.0 07/31/2020    EGFRNONAA >60.0 08/01/2019         Assessment/Plan:     Problem List Items Addressed This Visit        1 - High    Craniopharyngioma     I have asked patient and her mom to send me MRI CDs so that I can review but sounds like there has not been a significant reduction in size of craniopharyngioma.  Will continue to monitor along with [facility]            2     Female hypogonadism     Hold off on estrogen replacement for now as she did 2 episodes of spotting, if no resumption of menses in 6 months, would recommend starting estrogen replacement as she is still building her bone density this  time.            3     Secondary hypothyroidism     Continue half pill of levothyroxine 125 mcg daily.  Normal FT4 last month.            4     Diabetes insipidus - Primary     Has been off DDAVP for about a week with no polyuria or polydipsia.  Will check sodium today and if normal okay to stop taking DDAVP.         Relevant Orders    Basic Metabolic Panel (Completed)          Patient Instructions   Will check sodium today.  Okay to stay of ddavp if sodium normal and no excess urination overnight.      Continue with 1/2 tablet levothyroxine 125 mcg daily.      Let me know if you get symptoms of unexpected weight loss, nausea, vomiting, lightheadedness. Extreme fatigue.      Hold off on estrogen for now.      Send me MRI CD        Virtual in 6 mo,  Lab today.     Shandra Santamaria MD

## 2020-10-13 NOTE — ASSESSMENT & PLAN NOTE
Hold off on estrogen replacement for now as she did 2 episodes of spotting, if no resumption of menses in 6 months, would recommend starting estrogen replacement as she is still building her bone density this time.

## 2020-10-13 NOTE — ASSESSMENT & PLAN NOTE
I have asked patient and her mom to send me MRI CDs so that I can review but sounds like there has not been a significant reduction in size of craniopharyngioma.  Will continue to monitor along with [facility]

## 2020-10-13 NOTE — ASSESSMENT & PLAN NOTE
Has been off DDAVP for about a week with no polyuria or polydipsia.  Will check sodium today and if normal okay to stop taking DDAVP.

## 2021-01-04 ENCOUNTER — PATIENT MESSAGE (OUTPATIENT)
Dept: ADMINISTRATIVE | Facility: HOSPITAL | Age: 21
End: 2021-01-04

## 2021-01-07 RX ORDER — LEVOTHYROXINE SODIUM 125 UG/1
62.5 TABLET ORAL DAILY
Start: 2021-01-07 | End: 2021-01-10 | Stop reason: SDUPTHER

## 2021-01-10 ENCOUNTER — TELEPHONE (OUTPATIENT)
Dept: ENDOCRINOLOGY | Facility: HOSPITAL | Age: 21
End: 2021-01-10

## 2021-01-10 DIAGNOSIS — E03.8 SECONDARY HYPOTHYROIDISM: Primary | ICD-10-CM

## 2021-01-10 RX ORDER — LEVOTHYROXINE SODIUM 125 UG/1
62.5 TABLET ORAL DAILY
Qty: 45 TABLET | Refills: 3 | Status: SHIPPED | OUTPATIENT
Start: 2021-01-10 | End: 2021-04-29 | Stop reason: SDUPTHER

## 2021-04-05 ENCOUNTER — PATIENT MESSAGE (OUTPATIENT)
Dept: ADMINISTRATIVE | Facility: HOSPITAL | Age: 21
End: 2021-04-05

## 2021-04-16 ENCOUNTER — PATIENT MESSAGE (OUTPATIENT)
Dept: RESEARCH | Facility: HOSPITAL | Age: 21
End: 2021-04-16

## 2021-04-30 ENCOUNTER — PATIENT OUTREACH (OUTPATIENT)
Dept: ADMINISTRATIVE | Facility: OTHER | Age: 21
End: 2021-04-30

## 2021-05-04 ENCOUNTER — OFFICE VISIT (OUTPATIENT)
Dept: ENDOCRINOLOGY | Facility: CLINIC | Age: 21
End: 2021-05-04
Payer: COMMERCIAL

## 2021-05-04 ENCOUNTER — PATIENT MESSAGE (OUTPATIENT)
Dept: ENDOCRINOLOGY | Facility: CLINIC | Age: 21
End: 2021-05-04

## 2021-05-04 ENCOUNTER — TELEPHONE (OUTPATIENT)
Dept: ENDOCRINOLOGY | Facility: CLINIC | Age: 21
End: 2021-05-04

## 2021-05-04 DIAGNOSIS — D44.4 CRANIOPHARYNGIOMA: ICD-10-CM

## 2021-05-04 DIAGNOSIS — E03.8 SECONDARY HYPOTHYROIDISM: ICD-10-CM

## 2021-05-04 DIAGNOSIS — E28.39 FEMALE HYPOGONADISM: Primary | ICD-10-CM

## 2021-05-04 PROCEDURE — 99214 OFFICE O/P EST MOD 30 MIN: CPT | Mod: 95,,, | Performed by: INTERNAL MEDICINE

## 2021-05-04 PROCEDURE — 99214 PR OFFICE/OUTPT VISIT, EST, LEVL IV, 30-39 MIN: ICD-10-PCS | Mod: 95,,, | Performed by: INTERNAL MEDICINE

## 2021-05-04 RX ORDER — ESTRADIOL 0.03 MG/D
FILM, EXTENDED RELEASE TRANSDERMAL
Qty: 8 PATCH | Refills: 2 | Status: SHIPPED | OUTPATIENT
Start: 2021-05-04 | End: 2021-06-17

## 2021-05-04 RX ORDER — ESTRADIOL 0.5 MG/1
0.5 TABLET ORAL DAILY
Qty: 30 TABLET | Refills: 11 | Status: SHIPPED | OUTPATIENT
Start: 2021-05-04 | End: 2021-05-04

## 2021-05-18 PROBLEM — E23.2 DIABETES INSIPIDUS: Status: RESOLVED | Noted: 2020-01-21 | Resolved: 2021-05-18

## 2021-06-01 RX ORDER — CHOLECALCIFEROL (VITAMIN D3) 25 MCG
1000 TABLET ORAL DAILY
Qty: 90 TABLET | Refills: 3 | Status: SHIPPED | OUTPATIENT
Start: 2021-06-01 | End: 2021-06-17 | Stop reason: SDUPTHER

## 2021-06-09 ENCOUNTER — PATIENT MESSAGE (OUTPATIENT)
Dept: ENDOCRINOLOGY | Facility: CLINIC | Age: 21
End: 2021-06-09

## 2021-06-17 ENCOUNTER — PATIENT MESSAGE (OUTPATIENT)
Dept: ENDOCRINOLOGY | Facility: CLINIC | Age: 21
End: 2021-06-17

## 2021-06-17 RX ORDER — CHOLECALCIFEROL (VITAMIN D3) 25 MCG
1000 TABLET ORAL 2 TIMES DAILY
Qty: 180 TABLET | Refills: 3 | Status: SHIPPED | OUTPATIENT
Start: 2021-06-17 | End: 2022-06-28 | Stop reason: SDUPTHER

## 2021-06-30 DIAGNOSIS — E28.39 FEMALE HYPOGONADISM: ICD-10-CM

## 2021-07-01 RX ORDER — ESTRADIOL 0.03 MG/D
FILM, EXTENDED RELEASE TRANSDERMAL
Qty: 8 PATCH | Refills: 2 | Status: SHIPPED | OUTPATIENT
Start: 2021-07-01 | End: 2021-08-23

## 2021-07-06 ENCOUNTER — PATIENT MESSAGE (OUTPATIENT)
Dept: ADMINISTRATIVE | Facility: HOSPITAL | Age: 21
End: 2021-07-06

## 2021-08-06 ENCOUNTER — TELEPHONE (OUTPATIENT)
Dept: ENDOCRINOLOGY | Facility: CLINIC | Age: 21
End: 2021-08-06

## 2021-08-09 ENCOUNTER — PATIENT MESSAGE (OUTPATIENT)
Dept: ENDOCRINOLOGY | Facility: CLINIC | Age: 21
End: 2021-08-09

## 2021-08-23 ENCOUNTER — PATIENT MESSAGE (OUTPATIENT)
Dept: ENDOCRINOLOGY | Facility: CLINIC | Age: 21
End: 2021-08-23

## 2021-08-23 DIAGNOSIS — E28.39 FEMALE HYPOGONADISM: Primary | ICD-10-CM

## 2021-08-23 RX ORDER — ESTRADIOL 0.25 MG/.25G
0.25 GEL TOPICAL DAILY
Qty: 30 PACKET | Refills: 2 | Status: SHIPPED | OUTPATIENT
Start: 2021-08-23 | End: 2022-01-12

## 2021-08-24 ENCOUNTER — PATIENT MESSAGE (OUTPATIENT)
Dept: ENDOCRINOLOGY | Facility: CLINIC | Age: 21
End: 2021-08-24

## 2021-09-30 ENCOUNTER — OFFICE VISIT (OUTPATIENT)
Dept: URGENT CARE | Facility: CLINIC | Age: 21
End: 2021-09-30
Payer: COMMERCIAL

## 2021-09-30 VITALS
HEIGHT: 56 IN | TEMPERATURE: 98 F | OXYGEN SATURATION: 98 % | SYSTOLIC BLOOD PRESSURE: 125 MMHG | BODY MASS INDEX: 22.5 KG/M2 | WEIGHT: 100 LBS | RESPIRATION RATE: 16 BRPM | DIASTOLIC BLOOD PRESSURE: 83 MMHG | HEART RATE: 83 BPM

## 2021-09-30 DIAGNOSIS — S01.03XA PUNCTURE WOUND OF SCALP, INITIAL ENCOUNTER: ICD-10-CM

## 2021-09-30 DIAGNOSIS — S00.01XA ABRASION OF SCALP, INITIAL ENCOUNTER: ICD-10-CM

## 2021-09-30 DIAGNOSIS — S00.03XA CONTUSION OF SCALP, INITIAL ENCOUNTER: Primary | ICD-10-CM

## 2021-09-30 PROCEDURE — 70260 X-RAY EXAM OF SKULL: CPT | Mod: FY,S$GLB,, | Performed by: RADIOLOGY

## 2021-09-30 PROCEDURE — 70260 XR SKULL COMPLETE MIN 4 VIEWS: ICD-10-PCS | Mod: FY,S$GLB,, | Performed by: RADIOLOGY

## 2021-09-30 PROCEDURE — 99204 OFFICE O/P NEW MOD 45 MIN: CPT | Mod: S$GLB,,, | Performed by: PREVENTIVE MEDICINE

## 2021-09-30 PROCEDURE — 99204 PR OFFICE/OUTPT VISIT, NEW, LEVL IV, 45-59 MIN: ICD-10-PCS | Mod: S$GLB,,, | Performed by: PREVENTIVE MEDICINE

## 2021-10-04 ENCOUNTER — PATIENT MESSAGE (OUTPATIENT)
Dept: ADMINISTRATIVE | Facility: HOSPITAL | Age: 21
End: 2021-10-04

## 2021-12-17 ENCOUNTER — OFFICE VISIT (OUTPATIENT)
Dept: OBSTETRICS AND GYNECOLOGY | Facility: CLINIC | Age: 21
End: 2021-12-17
Payer: COMMERCIAL

## 2021-12-17 VITALS
DIASTOLIC BLOOD PRESSURE: 69 MMHG | HEIGHT: 56 IN | SYSTOLIC BLOOD PRESSURE: 104 MMHG | BODY MASS INDEX: 22.82 KG/M2 | HEART RATE: 79 BPM | WEIGHT: 101.44 LBS

## 2021-12-17 DIAGNOSIS — Z01.419 WELL WOMAN EXAM WITH ROUTINE GYNECOLOGICAL EXAM: ICD-10-CM

## 2021-12-17 DIAGNOSIS — E28.39 FEMALE HYPOGONADISM: Primary | ICD-10-CM

## 2021-12-17 PROCEDURE — 99385 PR PREVENTIVE VISIT,NEW,18-39: ICD-10-PCS | Mod: S$GLB,,, | Performed by: STUDENT IN AN ORGANIZED HEALTH CARE EDUCATION/TRAINING PROGRAM

## 2021-12-17 PROCEDURE — 87591 N.GONORRHOEAE DNA AMP PROB: CPT | Performed by: STUDENT IN AN ORGANIZED HEALTH CARE EDUCATION/TRAINING PROGRAM

## 2021-12-17 PROCEDURE — 88175 CYTOPATH C/V AUTO FLUID REDO: CPT | Performed by: STUDENT IN AN ORGANIZED HEALTH CARE EDUCATION/TRAINING PROGRAM

## 2021-12-17 PROCEDURE — 99385 PREV VISIT NEW AGE 18-39: CPT | Mod: S$GLB,,, | Performed by: STUDENT IN AN ORGANIZED HEALTH CARE EDUCATION/TRAINING PROGRAM

## 2021-12-17 PROCEDURE — 99999 PR PBB SHADOW E&M-EST. PATIENT-LVL II: CPT | Mod: PBBFAC,,, | Performed by: STUDENT IN AN ORGANIZED HEALTH CARE EDUCATION/TRAINING PROGRAM

## 2021-12-17 PROCEDURE — 99999 PR PBB SHADOW E&M-EST. PATIENT-LVL II: ICD-10-PCS | Mod: PBBFAC,,, | Performed by: STUDENT IN AN ORGANIZED HEALTH CARE EDUCATION/TRAINING PROGRAM

## 2021-12-17 PROCEDURE — 87491 CHLMYD TRACH DNA AMP PROBE: CPT | Performed by: STUDENT IN AN ORGANIZED HEALTH CARE EDUCATION/TRAINING PROGRAM

## 2021-12-18 ENCOUNTER — IMMUNIZATION (OUTPATIENT)
Dept: INTERNAL MEDICINE | Facility: CLINIC | Age: 21
End: 2021-12-18
Payer: COMMERCIAL

## 2021-12-18 DIAGNOSIS — Z23 NEED FOR VACCINATION: Primary | ICD-10-CM

## 2021-12-18 PROCEDURE — 0004A COVID-19, MRNA, LNP-S, PF, 30 MCG/0.3 ML DOSE VACCINE: CPT | Mod: CV19,PBBFAC | Performed by: INTERNAL MEDICINE

## 2021-12-23 LAB
CLINICAL INFO: NORMAL
CYTO CVX: NORMAL
CYTOLOGIST CVX/VAG CYTO: NORMAL
CYTOLOGIST CVX/VAG CYTO: NORMAL
CYTOLOGY CMNT CVX/VAG CYTO-IMP: NORMAL
CYTOLOGY PAP THIN PREP EXPLANATION: NORMAL
DATE OF PREVIOUS PAP: NORMAL
DATE PREVIOUS BX: NO
GEN CATEG CVX/VAG CYTO-IMP: NORMAL
LMP START DATE: 0
MICROORGANISM CVX/VAG CYTO: NORMAL
PATHOLOGIST CVX/VAG CYTO: NORMAL
SERVICE CMNT-IMP: NORMAL
SPECIMEN SOURCE CVX/VAG CYTO: NORMAL
STAT OF ADQ CVX/VAG CYTO-IMP: NORMAL

## 2021-12-25 LAB
C TRACH DNA SPEC QL NAA+PROBE: NOT DETECTED
N GONORRHOEA DNA SPEC QL NAA+PROBE: NOT DETECTED

## 2021-12-30 ENCOUNTER — PATIENT MESSAGE (OUTPATIENT)
Dept: ENDOCRINOLOGY | Facility: CLINIC | Age: 21
End: 2021-12-30
Payer: COMMERCIAL

## 2022-01-02 NOTE — TELEPHONE ENCOUNTER
Outside labs reviewed showing low estradiol level (unclear if patient is still taking transdermal estrogen as she was having skin reaction and persistent mild elevation of prolactin.  Patient due for follow-up visit so will have her schedule.    Outside labs December 13, 2021  Sodium 136  Normal CMP HDL 44    Triglycerides 91  Normal CBC  Testosterone 42 (8-48)  A.m. cortisol 9  Estradiol 11.5  FSH 6.9  LH 4.6  Prolactin 31 (1.9-25)  Free T4 1.2 (1.0-2.1)  TSH 0.435 (0.4-4.0)  Twenty-five OH vitamin-D 34  IGF-1 90 (103-326) Z-score -2.9    Shandra Santamaria MD

## 2022-01-03 ENCOUNTER — PATIENT MESSAGE (OUTPATIENT)
Dept: ENDOCRINOLOGY | Facility: CLINIC | Age: 22
End: 2022-01-03
Payer: COMMERCIAL

## 2022-01-08 ENCOUNTER — PATIENT MESSAGE (OUTPATIENT)
Dept: ENDOCRINOLOGY | Facility: CLINIC | Age: 22
End: 2022-01-08
Payer: COMMERCIAL

## 2022-01-13 ENCOUNTER — PATIENT MESSAGE (OUTPATIENT)
Dept: ENDOCRINOLOGY | Facility: CLINIC | Age: 22
End: 2022-01-13
Payer: COMMERCIAL

## 2022-01-18 ENCOUNTER — PATIENT MESSAGE (OUTPATIENT)
Dept: ENDOCRINOLOGY | Facility: CLINIC | Age: 22
End: 2022-01-18

## 2022-01-18 ENCOUNTER — OFFICE VISIT (OUTPATIENT)
Dept: ENDOCRINOLOGY | Facility: CLINIC | Age: 22
End: 2022-01-18
Payer: COMMERCIAL

## 2022-01-18 DIAGNOSIS — E28.39 FEMALE HYPOGONADISM: ICD-10-CM

## 2022-01-18 DIAGNOSIS — E03.8 SECONDARY HYPOTHYROIDISM: ICD-10-CM

## 2022-01-18 DIAGNOSIS — D44.4 CRANIOPHARYNGIOMA: ICD-10-CM

## 2022-01-18 PROCEDURE — 99214 PR OFFICE/OUTPT VISIT, EST, LEVL IV, 30-39 MIN: ICD-10-PCS | Mod: 95,,, | Performed by: INTERNAL MEDICINE

## 2022-01-18 PROCEDURE — 99214 OFFICE O/P EST MOD 30 MIN: CPT | Mod: 95,,, | Performed by: INTERNAL MEDICINE

## 2022-01-18 RX ORDER — ESTRADIOL 0.5 MG/.5G
GEL TOPICAL
Qty: 30 PACKET | Refills: 2 | Status: SHIPPED | OUTPATIENT
Start: 2022-01-18 | End: 2022-05-31

## 2022-01-18 RX ORDER — LEVOTHYROXINE SODIUM 75 UG/1
75 TABLET ORAL
Qty: 30 TABLET | Refills: 3 | Status: SHIPPED | OUTPATIENT
Start: 2022-01-18 | End: 2022-08-23

## 2022-01-18 NOTE — ASSESSMENT & PLAN NOTE
Per patient MRI done at [facility] in December 2021 showed no significant change.  She will send me MRI report through portal and continue to follow-up with [facility] for periodic imaging.    She also reports visual fields done in December 2021 with no significant change, will also send report.

## 2022-01-18 NOTE — PROGRESS NOTES
PITUITARY [facility] ENDOCRINOLOGY FOLLOW UP  01/18/2022     The patient location is: work    Visit type: audiovisual    Face to Face time with patient: 18  30 minutes of total time spent on the encounter, which includes face to face time and non-face to face time preparing to see the patient (eg, review of tests), Obtaining and/or reviewing separately obtained history, Documenting clinical information in the electronic or other health record, Independently interpreting results (not separately reported) and communicating results to the patient/family/caregiver, or Care coordination (not separately reported).     Each patient to whom he or she provides medical services by telemedicine is:  (1) informed of the relationship between the physician and patient and the respective role of any other health care provider with respect to management of the patient; and (2) notified that he or she may decline to receive medical services by telemedicine and may withdraw from such care at any time.    The patient's last visit with me was on 5/4/2021.     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  She is being treated and followed at [facility] where she was treated w/ 30 rounds of proton beam starting 11/11/19.  Prior to proton beam she DI and central hypothyroidism.  She was started on LT4 and ddavp.  She will continue follow up at [facility] for periodic imaging.  Per patient last hvf (adrian visual fields) and MRI in 12/2021 without change and she will send me results via portal    denies symptoms of adrenal insufficiency (no lightheadedness, N/V/abd pain, hypotension).       Recent labs (in media)  Outside labs December 13, 2021  Sodium 136  Normal CMP HDL 44    Triglycerides  91  Normal CBC  Testosterone 42 (8-48)  A.m. cortisol 9  Estradiol 11.5  FSH 6.9  LH 4.6  Prolactin 31 (1.9-25)  Free T4 1.2 (1.0-2.1)  TSH 0.435 (0.4-4.0)  Twenty-five OH vitamin-D 34  IGF-1 90 (103-326) Z-score -2.9      Central Hypothyroidism:  Currently taking LT4 125 mcg 1/2 tab daily.  No symptoms of hypo/hyperthyroidism.    fT4 at low end of normal in 12/2021 (see above)    Thyroid Symptoms  Normal energy    Weight change:  []  Gain []  Loss  [x]  Denies   Currently 102 lbs    Temperature intolerance:  []  Cold []  Hot   [x]  Denies      GI:  []  Diarrhea []  Constipation [x]  Denies    Integument:  []  Hair loss [x]  Dry skin (chronic)  []  Denies    Other:  []  Palpitation []  tremor     []  Increased anxiety    [x]  Denies      No results found for: TSH, FREET4      diabetes insipidus- resolved  Was initially on nightly DDAVP but stopped taking it 1/2021 with no polyuria/polydipsia.   No nocturia    hypogonadotropic hypogonadism   Did not go through puberty as a child.  At age 19 she was treated w/ OCP for a few months with start of menstrual bleeding and growth of breast tissue but she stopped it 2/2 increased thirst, was not on desmopressin at that time.  Currently not on estrogen.  prescribed vivelle dot estrogen replacement but had skin reaction so changed to estradiol gel.  Currently on divigel 0.25 mg daily with plan to escalate dose every 6 month(s) to target adult replacement dose   Still w/o menstrual bleeding or spotting    GH deficiency:  Growth plates already fused based on x-rays done at [facility] and feeling well so not on GH replacement now.    Hx:  Initial presentation:   During childhood she stopped gaining height around age 9 while her twin sister had normal growth.  She was seen by a pediatric endocrinologist but per patient report growth plates were fused and she was not offered any treatment and hand no brain imaging.  She did not undergo menarche and had no development of breast  tissue.  As an adult she established care with a new PCP who found prolactin to be elevated thus pituitary MRI was obtained showing 1.9 cm craniopharyngioma abutting the optic chiasm.    Imaging:    Per patient 12/2021 MRI unchanged    6/7/21 (report in media)      9/9/19 - MRI Brain with the pt which shows a 1.93 cm (in its greatest extent) x 1.7 cm cystic tumor attached to the pituitary stalk with features typical for a craniopharyngioma. The tumor abuts both the optic chiasm and the hypothalamus.     Headache:    denies  Vision change:   denies  Formal Visual fields:     Per patient hvf (adrian visual fields) 12/2021 unchanged  6/7/21 -     9/17/19 normal HVF (scanned to media 9/26/19)      Past Medical History:   Diagnosis Date    Hypothyroidism     Hypothyroidism 8/1/2019         Current Outpatient Medications:     DIVIGEL 0.25 mg/0.25 gram (0.1 %) GlPk, PLACE 1 GEL ONTO THE SKIN DAILY, Disp: 30 packet, Rfl: 3    SYNTHROID 125 mcg tablet, Take 0.5 tablets (62.5 mcg total) by mouth once daily., Disp: 45 tablet, Rfl: 3    vitamin D (VITAMIN D3) 1000 units Tab, Take 1 tablet (1,000 Units total) by mouth 2 (two) times a day., Disp: 180 tablet, Rfl: 3      Family History   Problem Relation Age of Onset    No Known Problems Mother     No Known Problems Father     No Known Problems Sister     No Known Problems Brother        ROS see hpi    Objective:   Physical Exam   There were no vitals taken for this visit.  Wt Readings from Last 3 Encounters:   12/17/21 46 kg (101 lb 6.6 oz)   09/30/21 45.4 kg (100 lb)   10/05/20 44.6 kg (98 lb 5.2 oz)   ]    Constitutional:  Pleasant,  in no acute distress.   HENT:   Eyes:     No scleral icterus.   Respiratory:   Effort normal   Neurological:  normal speech  Psych:   Normal mood and affect.      LABORATORY REVIEW:  8/8/2020:   IGF-1 92 (Z-score is -2.9)   a.m. cortisol 9.2   estradiol 27.2   FSH 5.7  LH 4.29  prolactin 34.5  TSH is 0.037  free T4 1.5 (1-2.1)       Chemistry        Component Value Date/Time     10/05/2020 1623    K 3.8 10/05/2020 1623     10/05/2020 1623    CO2 24 10/05/2020 1623    BUN 9 10/05/2020 1623    CREATININE 0.7 10/05/2020 1623    GLU 79 10/05/2020 1623        Component Value Date/Time    CALCIUM 10.1 10/05/2020 1623    ALKPHOS 104 07/31/2020 0847    AST 25 07/31/2020 0847    ALT 23 07/31/2020 0847    BILITOT 0.2 07/31/2020 0847    ESTGFRAFRICA >60.0 10/05/2020 1623    EGFRNONAA >60.0 10/05/2020 1623          Lab Results   Component Value Date    PROLACTIN 46.8 (H) 08/16/2019    LABLH 3.6 05/28/2012    FSH 5.50 08/16/2019    FSH 5.8 05/28/2012    SOMATMDN 136 (L) 05/28/2012     10/05/2020     07/31/2020     08/01/2019    K 3.8 10/05/2020    K 3.7 07/31/2020    K 4.0 08/01/2019    CALCIUM 10.1 10/05/2020    CALCIUM 9.2 07/31/2020    CALCIUM 9.3 08/01/2019    ALBUMIN 3.5 07/31/2020    ALBUMIN 3.6 08/01/2019    ESTGFRAFRICA >60.0 10/05/2020    ESTGFRAFRICA >60.0 07/31/2020    ESTGFRAFRICA >60.0 08/01/2019    EGFRNONAA >60.0 10/05/2020    EGFRNONAA >60.0 07/31/2020    EGFRNONAA >60.0 08/01/2019         Assessment/Plan:     Problem List Items Addressed This Visit        1 - High    Craniopharyngioma     Per patient MRI done at [facility] in December 2021 showed no significant change.  She will send me MRI report through portal and continue to follow-up with [facility] for periodic imaging.    She also reports visual fields done in December 2021 with no significant change, will also send report.            2     Female hypogonadism     Currently arm low-dose transdermal estrogen gel (divigel 0.25 mg daily) with expectedly low estradiol levels.  She is tolerating this dose well with no spotting or menstrual bleeding so will increase to 0.5 mg daily with repeat estradiol level in 2 and months at which point I think we will need to further increase dose to 1 mg daily.  She will let me know if she starts to have any menstrual  bleeding as we will need to add on progesterone at that time.         Relevant Medications    estradioL (DIVIGEL) 0.5 mg/0.5 gram (0.1 %) GlPk    Other Relevant Orders    Estradiol       3     Secondary hypothyroidism     Recent free T4 at the low end of normal but without any symptoms of hypothyroidism so will increase from 67.5 mcg of levothyroxine daily up to 75 mcg daily with repeat free T4 in 2.5 months.         Relevant Medications    levothyroxine (SYNTHROID) 75 MCG tablet    Other Relevant Orders    T4, Free        Patient Instructions   In 2.5 months please get labs at [facility] (estradiol and free T4) - orders have been sent electronically so you can put a reminder in your calendar    Increase divigel to 0.5 mg daily (I sent a new prescription for this dose to your pharmacy but until you run out of the old 0.25 mg packets you can use two a day).  Please let me know if you start having any menstrual bleeding or spotting as we will need to add progesterone at that time.  If you are doing well on this dose then in 3 months send me a message so we can increase the dose further if the estradiol level is still <100.    Changed/increase to levothyroxine 75 mcg daily    As a reminder, it is typically best to take thyroid hormone first thing in the morning on an empty stomach by itself and then wait at least 30 mintues to eat or drink anything besides water to make sure your body absorbs the full dose of medication.  If you take any calcium, Tums, iron, or multivitamins then please separate them by at 4 hours from your thyroid medication as these supplements can interfere with the thyroid hormone getting absorbed in your but and being used by your body.  It can be helpful to use a separate pill box for just your thyroid medication because unlike other medications, if you miss a dose of thyroid medication and see that it is still left in your pill box, you can always take it later in the week.  What is important is that  on a weekly basis you are taking the same number of thyroid pills.  This cannot be done with most other medications.              Virtual in 6 month(s) pituitary clinic  Will get labs at [facility] in 2.5 mo    Shandra Santamaria MD

## 2022-01-18 NOTE — PATIENT INSTRUCTIONS
In 2.5 months please get labs at [facility] (estradiol and free T4) - orders have been sent electronically so you can put a reminder in your calendar    Increase divigel to 0.5 mg daily (I sent a new prescription for this dose to your pharmacy but until you run out of the old 0.25 mg packets you can use two a day).  Please let me know if you start having any menstrual bleeding or spotting as we will need to add progesterone at that time.  If you are doing well on this dose then in 3 months send me a message so we can increase the dose further if the estradiol level is still <100.    Changed/increase to levothyroxine 75 mcg daily    As a reminder, it is typically best to take thyroid hormone first thing in the morning on an empty stomach by itself and then wait at least 30 mintues to eat or drink anything besides water to make sure your body absorbs the full dose of medication.  If you take any calcium, Tums, iron, or multivitamins then please separate them by at 4 hours from your thyroid medication as these supplements can interfere with the thyroid hormone getting absorbed in your but and being used by your body.  It can be helpful to use a separate pill box for just your thyroid medication because unlike other medications, if you miss a dose of thyroid medication and see that it is still left in your pill box, you can always take it later in the week.  What is important is that on a weekly basis you are taking the same number of thyroid pills.  This cannot be done with most other medications.

## 2022-01-18 NOTE — ASSESSMENT & PLAN NOTE
Currently arm low-dose transdermal estrogen gel (divigel 0.25 mg daily) with expectedly low estradiol levels.  She is tolerating this dose well with no spotting or menstrual bleeding so will increase to 0.5 mg daily with repeat estradiol level in 2 and months at which point I think we will need to further increase dose to 1 mg daily.  She will let me know if she starts to have any menstrual bleeding as we will need to add on progesterone at that time.

## 2022-01-18 NOTE — ASSESSMENT & PLAN NOTE
Recent free T4 at the low end of normal but without any symptoms of hypothyroidism so will increase from 67.5 mcg of levothyroxine daily up to 75 mcg daily with repeat free T4 in 2.5 months.

## 2022-03-16 ENCOUNTER — PATIENT MESSAGE (OUTPATIENT)
Dept: ADMINISTRATIVE | Facility: HOSPITAL | Age: 22
End: 2022-03-16
Payer: COMMERCIAL

## 2022-04-13 ENCOUNTER — PATIENT MESSAGE (OUTPATIENT)
Dept: ENDOCRINOLOGY | Facility: CLINIC | Age: 22
End: 2022-04-13
Payer: COMMERCIAL

## 2022-04-24 ENCOUNTER — PATIENT MESSAGE (OUTPATIENT)
Dept: ENDOCRINOLOGY | Facility: CLINIC | Age: 22
End: 2022-04-24
Payer: COMMERCIAL

## 2022-05-23 ENCOUNTER — TELEPHONE (OUTPATIENT)
Dept: ENDOCRINOLOGY | Facility: CLINIC | Age: 22
End: 2022-05-23
Payer: COMMERCIAL

## 2022-05-23 NOTE — TELEPHONE ENCOUNTER
----- Message from Cary Pinto sent at 5/23/2022  8:41 AM CDT -----  Contact: Patient  Type: Patient Call Back         Who called: Patient         What is the request in detail: currently sched to coming tmrw for a f/u appt; going to [facility] around 6/6; wants to resched appt for following week 6/14; also wanted to know if she needs to do blood work prior; please advise           Best call back number: [phone] or [place]         Additional Information:            Thank You

## 2022-05-24 ENCOUNTER — PATIENT MESSAGE (OUTPATIENT)
Dept: ENDOCRINOLOGY | Facility: CLINIC | Age: 22
End: 2022-05-24
Payer: COMMERCIAL

## 2022-05-25 DIAGNOSIS — E28.39 FEMALE HYPOGONADISM: ICD-10-CM

## 2022-05-25 RX ORDER — ESTRADIOL 0.5 MG/.5G
GEL TOPICAL
OUTPATIENT
Start: 2022-05-25

## 2022-06-07 ENCOUNTER — PATIENT MESSAGE (OUTPATIENT)
Dept: ENDOCRINOLOGY | Facility: CLINIC | Age: 22
End: 2022-06-07
Payer: COMMERCIAL

## 2022-06-07 NOTE — TELEPHONE ENCOUNTER
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  The mass measures approximately 2.1 X 1.8 X 1.7 cm.  The mass demonstrates intrinsic T1 hyperintensity as well as signal loss on the T2 weighted images and susceptibility on the diffusion-weighted sequences consistent with calcification.  Consistent with craniopharyngioma    Labs:  06/06/2022  Sodium 139

## 2022-06-27 RX ORDER — VIT C/E/ZN/COPPR/LUTEIN/ZEAXAN 250MG-90MG
CAPSULE ORAL
Qty: 90 CAPSULE | OUTPATIENT
Start: 2022-06-27

## 2022-06-28 ENCOUNTER — OFFICE VISIT (OUTPATIENT)
Dept: ENDOCRINOLOGY | Facility: CLINIC | Age: 22
End: 2022-06-28
Payer: COMMERCIAL

## 2022-06-28 VITALS
SYSTOLIC BLOOD PRESSURE: 98 MMHG | OXYGEN SATURATION: 98 % | HEIGHT: 56 IN | BODY MASS INDEX: 24.16 KG/M2 | WEIGHT: 107.38 LBS | DIASTOLIC BLOOD PRESSURE: 62 MMHG | HEART RATE: 87 BPM

## 2022-06-28 DIAGNOSIS — E23.0: ICD-10-CM

## 2022-06-28 DIAGNOSIS — D44.4 CRANIOPHARYNGIOMA: ICD-10-CM

## 2022-06-28 DIAGNOSIS — E03.8 SECONDARY HYPOTHYROIDISM: ICD-10-CM

## 2022-06-28 DIAGNOSIS — E55.9 VITAMIN D DEFICIENCY: ICD-10-CM

## 2022-06-28 DIAGNOSIS — E28.39 FEMALE HYPOGONADISM: Primary | ICD-10-CM

## 2022-06-28 PROCEDURE — 99215 OFFICE O/P EST HI 40 MIN: CPT | Mod: S$GLB,,, | Performed by: INTERNAL MEDICINE

## 2022-06-28 PROCEDURE — 99215 PR OFFICE/OUTPT VISIT, EST, LEVL V, 40-54 MIN: ICD-10-PCS | Mod: S$GLB,,, | Performed by: INTERNAL MEDICINE

## 2022-06-28 PROCEDURE — 99999 PR PBB SHADOW E&M-EST. PATIENT-LVL III: CPT | Mod: PBBFAC,,, | Performed by: INTERNAL MEDICINE

## 2022-06-28 PROCEDURE — 99999 PR PBB SHADOW E&M-EST. PATIENT-LVL III: ICD-10-PCS | Mod: PBBFAC,,, | Performed by: INTERNAL MEDICINE

## 2022-06-28 RX ORDER — ESTRADIOL 1 MG/G
1 GEL TOPICAL DAILY
Qty: 90 G | Refills: 1 | Status: SHIPPED | OUTPATIENT
Start: 2022-06-28 | End: 2022-09-06

## 2022-06-28 RX ORDER — CHOLECALCIFEROL (VITAMIN D3) 50 MCG
2000 TABLET ORAL DAILY
Qty: 90 TABLET | Refills: 3 | Status: SHIPPED | OUTPATIENT
Start: 2022-06-28 | End: 2023-10-19 | Stop reason: SDUPTHER

## 2022-06-28 NOTE — PROGRESS NOTES
PITUITARY CLIN ENDOCRINOLOGY FOLLOW UP  06/28/2022     The patient's last visit with me was on 1/18/2022.     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  She is being treated and followed at [facility] where she was treated w/ 30 rounds of proton beam starting 11/11/19 (as part of clinical trial).  Prior to proton beam she had DI and central hypothyroidism, also lonstanding hx of hypogonadotropic hypogonadism and GH deficiency .  She was started on LT4 and ddavp (now off ddavp).  She will continue follow up at [facility] for periodic imaging.    Interval Hx:  At last visit we increased levothyroxine to 75 mcg daily and increased divigel to 0.5 mg daily.  Labs at [facility] earlier this month still with low estradiol of 11.9, fT4 of 1.8 (1.0-2.1), normal 8 am cortisol and Na.     denies symptoms of adrenal insufficiency (no lightheadedness, N/V/abd pain, hypotension).     Denies new HA or peripheral vision change  hvf (adrian visual fields) at [facility] 6/2022 stable w/o new changes (in media)    Brain MRI 06/06/2022 - [facility]  The residual solid and partially enhancing suprasellar mass is again demonstrated and is not appreciably changed in size, morphology, or signal characteristics.  The mass measures approximately 2.1 X 1.8 X 1.7 cm.  The mass demonstrates intrinsic T1 hyperintensity as well as signal loss on the T2 weighted images and susceptibility on the diffusion-weighted sequences consistent with calcification.  Consistent with craniopharyngioma    Central Hypothyroidism:  Currently taking levothyroxine 75 mcg daily.  No symptoms of hypo/hyperthyroidism.    fT4 at goal in upper half of normal range.     Thyroid Symptoms  Normal energy    Weight change:  []   Gain []  Loss  [x]  Denies     Wt Readings from Last 3 Encounters:   06/28/22 48.7 kg (107 lb 5.8 oz)   12/17/21 46 kg (101 lb 6.6 oz)   09/30/21 45.4 kg (100 lb)        Temperature intolerance:  []  Cold []  Hot   [x]  Denies      GI:  []  Diarrhea []  Constipation [x]  Denies    Integument:  []  Hair loss []  Dry skin []  Denies    Other:  []  Palpitation []  tremor     []  Increased anxiety    [x]  Denies        diabetes insipidus- resolved  Was initially on nightly DDAVP but stopped taking it 1/2021 with no polyuria/polydipsia.   No nocturia  Normal Na 6/2022    hypogonadotropic hypogonadism   Did not go through puberty as a child.  At age 19 she was treated w/ OCP for a few months with start of menstrual bleeding and growth of breast tissue but she stopped it 2/2 increased thirst, was not on desmopressin at that time.     prescribed vivelle dot estrogen replacement but had skin reaction so changed to estradiol gel.  Currently on divigel 0.5 mg daily with plan to escalate dose every 6 month(s) to target adult replacement dose   Still w/o menstrual bleeding or spotting  no vaginal dryness  She is interested in trying for a biological child in the next couple years, has not seen fertility specialist    GH deficiency:  Growth plates already fused based on x-rays done at [facility] and feeling well so not on GH replacement now.     Hx:  Initial presentation:   During childhood she stopped gaining height around age 9 while her twin sister had normal growth.  She was seen by a pediatric endocrinologist but per patient report growth plates were fused and she was not offered any treatment and hand no brain imaging.  She did not undergo menarche and had no development of breast tissue.  As an adult she established care with a new PCP who found prolactin to be elevated thus pituitary MRI was obtained showing 1.9 cm craniopharyngioma abutting the optic chiasm.    Per patient hvf (adrian visual fields) - stable  6/2022 -      6/7/21 -     9/17/19 normal HVF (scanned to media 9/26/19)          Current Outpatient Medications:     estradioL (DIVIGEL) 0.5 mg/0.5 gram (0.1 %) GlPk, APPLY 1 PACKET TO THE SKIN DAILY, Disp: 30 packet, Rfl: 3    levothyroxine (SYNTHROID) 75 MCG tablet, Take 1 tablet (75 mcg total) by mouth before breakfast., Disp: 30 tablet, Rfl: 3    vitamin D (VITAMIN D3) 1000 units Tab, Take 1 tablet (1,000 Units total) by mouth 2 (two) times a day., Disp: 180 tablet, Rfl: 3      Family History   Problem Relation Age of Onset    No Known Problems Mother     No Known Problems Father     No Known Problems Sister     No Known Problems Brother        ROS see hpi    Objective:   Physical Exam   BP 98/62   Pulse 87   Wt 48.7 kg (107 lb 5.8 oz)   SpO2 98%   BMI 24.07 kg/m²   Wt Readings from Last 3 Encounters:   06/28/22 48.7 kg (107 lb 5.8 oz)   12/17/21 46 kg (101 lb 6.6 oz)   09/30/21 45.4 kg (100 lb)   ]    Constitutional:  Pleasant,  in no acute distress.   HENT:   Eyes:     No scleral icterus.   Respiratory:   Effort normal   Neurological:  normal speech  Psych:   Normal mood and affect.      LABORATORY REVIEW:  8/8/2020:   IGF-1 92 (Z-score is -2.9)   a.m. cortisol 9.2   estradiol 27.2   FSH 5.7  LH 4.29  prolactin 34.5  TSH is 0.037  free T4 1.5 (1-2.1)    Recent labs (in media)  Outside labs December 13, 2021  Sodium 136  Normal CMP HDL 44    Triglycerides 91  Normal CBC  Testosterone 42 (8-48)  A.m. cortisol 9  Estradiol 11.5  FSH 6.9  LH 4.6  Prolactin 31 (1.9-25)  Free T4 1.2 (1.0-2.1)  TSH 0.435 (0.4-4.0)  Twenty-five OH vitamin-D 34  IGF-1 90 (103-326) Z-score -2.9    Labs:  06/06/2022  Sodium 139                Chemistry        Component Value Date/Time     10/05/2020 1623    K 3.8 10/05/2020 1623     10/05/2020 1623    CO2 24 10/05/2020 1623    BUN 9 10/05/2020 1623    CREATININE 0.7 10/05/2020 1623    GLU 79 10/05/2020 1623        Component Value Date/Time    CALCIUM 10.1 10/05/2020  1623    ALKPHOS 104 07/31/2020 0847    AST 25 07/31/2020 0847    ALT 23 07/31/2020 0847    BILITOT 0.2 07/31/2020 0847    ESTGFRAFRICA >60.0 10/05/2020 1623    EGFRNONAA >60.0 10/05/2020 1623          Lab Results   Component Value Date    PROLACTIN 46.8 (H) 08/16/2019    LABLH 3.6 05/28/2012    FSH 5.50 08/16/2019    FSH 5.8 05/28/2012    SOMATMDN 136 (L) 05/28/2012     10/05/2020     07/31/2020     08/01/2019    K 3.8 10/05/2020    K 3.7 07/31/2020    K 4.0 08/01/2019    CALCIUM 10.1 10/05/2020    CALCIUM 9.2 07/31/2020    CALCIUM 9.3 08/01/2019    ALBUMIN 3.5 07/31/2020    ALBUMIN 3.6 08/01/2019    ESTGFRAFRICA >60.0 10/05/2020    ESTGFRAFRICA >60.0 07/31/2020    ESTGFRAFRICA >60.0 08/01/2019    EGFRNONAA >60.0 10/05/2020    EGFRNONAA >60.0 07/31/2020    EGFRNONAA >60.0 08/01/2019     Imaging:    Per patient 12/2021 MRI unchanged    6/7/21 (report in media)      9/9/19 - MRI Brain with the pt which shows a 1.93 cm (in its greatest extent) x 1.7 cm cystic tumor attached to the pituitary stalk with features typical for a craniopharyngioma. The tumor abuts both the optic chiasm and the hypothalamus.       Assessment/Plan:     Problem List Items Addressed This Visit        1 - High    Craniopharyngioma     Has been stable in size with no new vision changes on hvf (adrian visual fields) done 6/2022, will continue with periodic imaging and monitoring at [facility] as part of clinical trial.                  2     Female hypogonadism - Primary     No menstrual bleeding on low dose divigel 0.5 mg so will increase to 1 mg and will likely need to continue to increase q6 month(s) if no bleeding.  Once she has bleeding will start progesterone.     Discussed that she will need fertility assistance likely with IVF when ready to have a biological child and will need to establish care with fertility clinic prior to that.             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  She is feeling well and declined starting GH today.  We discussed that we can reconsider in the future as there is some data suggesting GH replacement can be beneficial for cardiovascular health.  Will follow up lipids with next labs and if there is dyslipidemia would recommend starting GH.              Other Visit Diagnoses     Vitamin D deficiency        Relevant Medications    vitamin D (VITAMIN D3) 50 mcg (2,000 unit) Tab            Virtual in 6 month(s) general endocrine clinic    Shandra Santamaria MD      I spent 25 minutes face-to-face with the patient, and an additional 15 minutes reviewing previous records and charting.

## 2022-06-28 NOTE — ASSESSMENT & PLAN NOTE
Has been stable in size with no new vision changes on hvf (adrian visual fields) done 6/2022, will continue with periodic imaging and monitoring at [facility] as part of clinical trial.

## 2022-06-28 NOTE — ASSESSMENT & PLAN NOTE
Onset during childhood as she has been panhypopit, did not go through puberty or have normal growth curve.  She is feeling well and declined starting GH today.  We discussed that we can reconsider in the future as there is some data suggesting GH replacement can be beneficial for cardiovascular health.  Will follow up lipids with next labs and if there is dyslipidemia would recommend starting GH.

## 2022-06-28 NOTE — ASSESSMENT & PLAN NOTE
No menstrual bleeding on low dose divigel 0.5 mg so will increase to 1 mg and will likely need to continue to increase q6 month(s) if no bleeding.  Once she has bleeding will start progesterone.     Discussed that she will need fertility assistance likely with IVF when ready to have a biological child and will need to establish care with fertility clinic prior to that.

## 2023-01-11 ENCOUNTER — PATIENT MESSAGE (OUTPATIENT)
Dept: ENDOCRINOLOGY | Facility: CLINIC | Age: 23
End: 2023-01-11
Payer: COMMERCIAL

## 2023-01-16 ENCOUNTER — PATIENT MESSAGE (OUTPATIENT)
Dept: OBSTETRICS AND GYNECOLOGY | Facility: CLINIC | Age: 23
End: 2023-01-16
Payer: COMMERCIAL

## 2023-01-17 ENCOUNTER — TELEPHONE (OUTPATIENT)
Dept: ENDOCRINOLOGY | Facility: CLINIC | Age: 23
End: 2023-01-17
Payer: COMMERCIAL

## 2023-01-17 DIAGNOSIS — D44.4 CRANIOPHARYNGIOMA: Primary | ICD-10-CM

## 2023-01-18 ENCOUNTER — PATIENT MESSAGE (OUTPATIENT)
Dept: ENDOCRINOLOGY | Facility: CLINIC | Age: 23
End: 2023-01-18

## 2023-01-18 ENCOUNTER — OFFICE VISIT (OUTPATIENT)
Dept: ENDOCRINOLOGY | Facility: CLINIC | Age: 23
End: 2023-01-18
Payer: COMMERCIAL

## 2023-01-18 DIAGNOSIS — E03.8 SECONDARY HYPOTHYROIDISM: ICD-10-CM

## 2023-01-18 DIAGNOSIS — D44.4 CRANIOPHARYNGIOMA: ICD-10-CM

## 2023-01-18 DIAGNOSIS — E28.39 FEMALE HYPOGONADISM: ICD-10-CM

## 2023-01-18 PROCEDURE — 99214 PR OFFICE/OUTPT VISIT, EST, LEVL IV, 30-39 MIN: ICD-10-PCS | Mod: 95,,, | Performed by: INTERNAL MEDICINE

## 2023-01-18 PROCEDURE — 99214 OFFICE O/P EST MOD 30 MIN: CPT | Mod: 95,,, | Performed by: INTERNAL MEDICINE

## 2023-01-18 NOTE — PROGRESS NOTES
PITUITARY CLIN ENDOCRINOLOGY FOLLOW UP  01/18/2023     The patient's last visit with me was on 1/18/2022.     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  She is being treated and followed at [facility] where she was treated w/ 30 rounds of proton beam starting 11/11/19 (as part of clinical trial).  Prior to proton beam she had DI and central hypothyroidism, also lonstanding hx of hypogonadotropic hypogonadism and GH deficiency .  She was started on LT4 and ddavp (now off ddavp).  She will continue follow up at [facility] for periodic imaging.    Interval Hx:  Since her last visit she has been feeling very well with no new complaints or medical problems today.    She had her regular follow-up at [facility] in December where she had medical study labs as well as visit with fertility specialist.  At that visit she had several labs including AMH which was normal.      Will be seeing  at fertility institute to discuss pregnancy options.  Still not having any menstrual bleeding on topical estrogen     [facility] labs reviewed 12/2022  AMH 5.11   TSH 0.068   Total T4 8.2 (4.5-12.5)  FT4 1.5 (1-2.1)   Cortisol 7.4  Prolactin 28.0   IGF-1 67 (Z-score-3.9)  Estradiol 16.6      denies symptoms of adrenal insufficiency (no lightheadedness, N/V/abd pain, hypotension).     Denies new HA or peripheral vision change  hvf (adrian visual fields) at [facility] 12/14/22 - Mild bitemporal loss , stable 2+ years    Brain MRI 06/06/2022 - [facility]  The residual solid and partially enhancing suprasellar mass is again demonstrated and is not appreciably changed in size, morphology, or signal characteristics.  The mass measures approximately 2.1 X 1.8 X 1.7 cm.  The mass demonstrates  intrinsic T1 hyperintensity as well as signal loss on the T2 weighted images and susceptibility on the diffusion-weighted sequences consistent with calcification.  Consistent with craniopharyngioma    Central Hypothyroidism:  Currently taking levothyroxine 75 mcg daily.  No symptoms of hypo/hyperthyroidism.    fT4 at goal in upper half of normal range.   12/12/22 fT4 1.5 (1-2.1)    Thyroid Symptoms  Normal energy    Weight change:  []  Gain []  Loss  [x]  Denies     Wt Readings from Last 3 Encounters:   06/28/22 48.7 kg (107 lb 5.8 oz)   12/17/21 46 kg (101 lb 6.6 oz)   09/30/21 45.4 kg (100 lb)        Temperature intolerance:  []  Cold []  Hot   [x]  Denies      GI:  []  Diarrhea []  Constipation [x]  Denies    Integument:  []  Hair loss []  Dry skin [x]  Denies    Other:  []  Palpitation []  tremor     []  Increased anxiety    [x]  Denies        diabetes insipidus- resolved  Was initially on nightly DDAVP but stopped taking it 1/2021 with no polyuria/polydipsia.   No nocturia  Normal Na     hypogonadotropic hypogonadism   Did not go through puberty as a child.  At age 19 she was treated w/ OCP for a few months with start of menstrual bleeding and growth of breast tissue but she stopped it 2/2 increased thirst, was not on desmopressin at that time.     prescribed vivelle dot estrogen replacement but had skin reaction so changed to estradiol gel with gradual dose escalation  Currently on divigel 1 mg daily   Still w/o menstrual bleeding or spotting  no vaginal dryness  She is interested in trying for a biological child soon, has not seen fertility specialist    GH deficiency:  Growth plates already fused based on x-rays done at [facility] and feeling well so not on GH replacement now.     Hx:  Initial presentation:   During childhood she stopped gaining height around age 9 while her twin sister had normal growth.  She was seen by a pediatric endocrinologist but per patient report growth plates were fused and she was  not offered any treatment and hand no brain imaging.  She did not undergo menarche and had no development of breast tissue.  As an adult she established care with a new PCP who found prolactin to be elevated thus pituitary MRI was obtained showing 1.9 cm craniopharyngioma abutting the optic chiasm.    Per patient hvf (adrian visual fields) - stable  6/2022 -     6/7/21 -     9/17/19 normal HVF (scanned to media 9/26/19)          Current Outpatient Medications:     estradioL (DIVIGEL) 1 mg/gram (0.1 %) topical gel, APPLY 1 GRAM ONTO THE SKIN DAILY, Disp: 30 g, Rfl: 3    levothyroxine (SYNTHROID) 75 MCG tablet, TAKE 1 TABLET(75 MCG) BY MOUTH BEFORE BREAKFAST, Disp: 30 tablet, Rfl: 3    vitamin D (VITAMIN D3) 50 mcg (2,000 unit) Tab, Take 1 tablet (2,000 Units total) by mouth once daily., Disp: 90 tablet, Rfl: 3    ROS see hpi    Objective:   Physical Exam   There were no vitals taken for this visit.  Wt Readings from Last 3 Encounters:   06/28/22 48.7 kg (107 lb 5.8 oz)   12/17/21 46 kg (101 lb 6.6 oz)   09/30/21 45.4 kg (100 lb)   ]    Constitutional:  Pleasant,  in no acute distress.   HENT:   Eyes:     No scleral icterus.   Respiratory:   Effort normal   Neurological:  normal speech  Psych:   Normal mood and affect.      LABORATORY REVIEW:  8/8/2020:   IGF-1 92 (Z-score is -2.9)   a.m. cortisol 9.2   estradiol 27.2   FSH 5.7  LH 4.29  prolactin 34.5  TSH is 0.037  free T4 1.5 (1-2.1)    Recent labs (in media)  Outside labs December 13, 2021  Sodium 136  Normal CMP HDL 44    Triglycerides 91  Normal CBC  Testosterone 42 (8-48)  A.m. cortisol 9  Estradiol 11.5  FSH 6.9  LH 4.6  Prolactin 31 (1.9-25)  Free T4 1.2 (1.0-2.1)  TSH 0.435 (0.4-4.0)  Twenty-five OH vitamin-D 34  IGF-1 90 (103-326) Z-score -2.9    Labs:  06/06/2022  Sodium 139                Chemistry        Component Value Date/Time     10/05/2020 1623    K 3.8 10/05/2020 1623     10/05/2020 1623    CO2 24 10/05/2020 1623    BUN 9  10/05/2020 1623    CREATININE 0.7 10/05/2020 1623    GLU 79 10/05/2020 1623        Component Value Date/Time    CALCIUM 10.1 10/05/2020 1623    ALKPHOS 104 07/31/2020 0847    AST 25 07/31/2020 0847    ALT 23 07/31/2020 0847    BILITOT 0.2 07/31/2020 0847    ESTGFRAFRICA >60.0 10/05/2020 1623    EGFRNONAA >60.0 10/05/2020 1623          Lab Results   Component Value Date    PROLACTIN 46.8 (H) 08/16/2019    LABLH 3.6 05/28/2012    FSH 5.50 08/16/2019    FSH 5.8 05/28/2012    SOMATMDN 136 (L) 05/28/2012     10/05/2020     07/31/2020     08/01/2019    K 3.8 10/05/2020    K 3.7 07/31/2020    K 4.0 08/01/2019    CALCIUM 10.1 10/05/2020    CALCIUM 9.2 07/31/2020    CALCIUM 9.3 08/01/2019    ALBUMIN 3.5 07/31/2020    ALBUMIN 3.6 08/01/2019    ESTGFRAFRICA >60.0 10/05/2020    ESTGFRAFRICA >60.0 07/31/2020    ESTGFRAFRICA >60.0 08/01/2019    EGFRNONAA >60.0 10/05/2020    EGFRNONAA >60.0 07/31/2020    EGFRNONAA >60.0 08/01/2019     Imaging:    Per patient 12/2021 MRI unchanged    6/7/21 (report in media)      9/9/19 - MRI Brain with the pt which shows a 1.93 cm (in its greatest extent) x 1.7 cm cystic tumor attached to the pituitary stalk with features typical for a craniopharyngioma. The tumor abuts both the optic chiasm and the hypothalamus.       Assessment/Plan:     Problem List Items Addressed This Visit          1 - High    Craniopharyngioma     Has been stable in size with no new vision changes on hvf (adrian visual fields).  She will continue with periodic imaging and monitoring at [facility] as part of clinical trial.                2     Female hypogonadism     As she had a skin reaction to estrogen patch in the past and declined oral estrogen she has been treated with Divigel now on highs dose but without withdrawal bleeding.    She is exploring options for becoming pregnant and will be seeing a fertility specialist.  Recommend pelvic imaging to evaluate maturity of the uterus and endometrial  lining.  If uterus remains under developed and thin lining may need to switch to oral estrogen for fertility specialist.      For now will increase divigel to 2 g daily but if no menstrual bleeding within the next months recommend switching to oral estrogen.         Relevant Medications    estradioL (DIVIGEL) 1 mg/gram (0.1 %) topical gel    Other Relevant Orders    Ambulatory referral/consult to Ophthalmology       3     Secondary hypothyroidism     fT4 at goal in upper half of normal, continue levothyroxine 75 mcg daily                  Virtual in 4 month(s) Friday pituitary clinic    Shandra Santamaria MD

## 2023-01-18 NOTE — Clinical Note
This is a patient with a craniopharyngioma who needs to see Dr. Carrillo with same day visual field testing in the couple months as she is planning to get pregnant and needs a baseline test before this.  Can you please help get her scheduled?

## 2023-01-24 RX ORDER — ESTRADIOL 1 MG/G
1.5 GEL TOPICAL DAILY
Qty: 45 G | Refills: 3 | Status: SHIPPED | OUTPATIENT
Start: 2023-01-24 | End: 2023-05-12

## 2023-01-26 ENCOUNTER — TELEPHONE (OUTPATIENT)
Dept: OPHTHALMOLOGY | Facility: CLINIC | Age: 23
End: 2023-01-26
Payer: COMMERCIAL

## 2023-01-26 ENCOUNTER — PATIENT MESSAGE (OUTPATIENT)
Dept: ENDOCRINOLOGY | Facility: CLINIC | Age: 23
End: 2023-01-26
Payer: COMMERCIAL

## 2023-01-26 NOTE — TELEPHONE ENCOUNTER
----- Message from Shandra Santamaria MD sent at 1/26/2023 10:28 AM CST -----  This is a patient with a craniopharyngioma who needs to see Dr. Carrillo with same day visual field testing in the couple months as she is planning to get pregnant and needs a baseline test before this.  Can you please help get her scheduled?

## 2023-01-26 NOTE — ASSESSMENT & PLAN NOTE
Has been stable in size with no new vision changes on hvf (adrian visual fields).  She will continue with periodic imaging and monitoring at [facility] as part of clinical trial.      We reviewed that pregnancy can result in normal hypertrophy of the pituitary gland that could potentially cause compression of optic chiasm and new vision problems.  If she does get pregnant would recommend that she have frequent visual field testing to monitor for this.  Referred to Neuro-Ophthalmology for baseline HVF

## 2023-01-26 NOTE — ASSESSMENT & PLAN NOTE
As she had a skin reaction to estrogen patch in the past and declined oral estrogen she has been treated with Divigel now on highs dose but without withdrawal bleeding.    She is exploring options for becoming pregnant and will be seeing a fertility specialist.  Recommend pelvic imaging to evaluate maturity of the uterus and endometrial lining.  If uterus remains under developed and thin lining may need to switch to oral estrogen for fertility specialist.      For now will increase divigel to 1.5 g daily but if no menstrual bleeding within the next months recommend switching to oral estrogen.

## 2023-01-31 ENCOUNTER — PATIENT MESSAGE (OUTPATIENT)
Dept: MATERNAL FETAL MEDICINE | Facility: CLINIC | Age: 23
End: 2023-01-31
Payer: COMMERCIAL

## 2023-02-06 ENCOUNTER — PATIENT MESSAGE (OUTPATIENT)
Dept: MATERNAL FETAL MEDICINE | Facility: CLINIC | Age: 23
End: 2023-02-06
Payer: COMMERCIAL

## 2023-02-13 ENCOUNTER — TELEPHONE (OUTPATIENT)
Dept: ENDOCRINOLOGY | Facility: CLINIC | Age: 23
End: 2023-02-13
Payer: COMMERCIAL

## 2023-02-14 ENCOUNTER — TELEPHONE (OUTPATIENT)
Dept: ENDOCRINOLOGY | Facility: CLINIC | Age: 23
End: 2023-02-14
Payer: COMMERCIAL

## 2023-04-12 ENCOUNTER — PATIENT OUTREACH (OUTPATIENT)
Dept: ADMINISTRATIVE | Facility: HOSPITAL | Age: 23
End: 2023-04-12
Payer: COMMERCIAL

## 2023-04-12 NOTE — PROGRESS NOTES
Health Maintenance Due   Topic Date Due    Hepatitis C Screening  Never done    HPV Vaccines (3 - 3-dose series) 12/14/2017    COVID-19 Vaccine (4 - Booster for Pfizer series) 02/12/2022    Influenza Vaccine (1) 09/01/2022    Chlamydia Screening  12/17/2022       HM updated. Chart reviewed, outreach for PCP visit.    Denisa Martinez LPN   Clinical Care Coordinator  Primary Care and Wellness

## 2023-05-12 ENCOUNTER — OFFICE VISIT (OUTPATIENT)
Dept: ENDOCRINOLOGY | Facility: CLINIC | Age: 23
End: 2023-05-12
Payer: COMMERCIAL

## 2023-05-12 DIAGNOSIS — E03.8 SECONDARY HYPOTHYROIDISM: ICD-10-CM

## 2023-05-12 DIAGNOSIS — E28.39 FEMALE HYPOGONADISM: Primary | ICD-10-CM

## 2023-05-12 DIAGNOSIS — D44.4 CRANIOPHARYNGIOMA: ICD-10-CM

## 2023-05-12 PROCEDURE — 99214 OFFICE O/P EST MOD 30 MIN: CPT | Mod: 95,,, | Performed by: INTERNAL MEDICINE

## 2023-05-12 PROCEDURE — 99214 PR OFFICE/OUTPT VISIT, EST, LEVL IV, 30-39 MIN: ICD-10-PCS | Mod: 95,,, | Performed by: INTERNAL MEDICINE

## 2023-05-12 RX ORDER — ESTRADIOL 0.5 MG/1
0.5 TABLET ORAL DAILY
Qty: 30 TABLET | Refills: 3 | Status: SHIPPED | OUTPATIENT
Start: 2023-05-12 | End: 2023-08-18

## 2023-05-12 NOTE — PROGRESS NOTES
PITUITARY [facility] ENDOCRINOLOGY FOLLOW UP  05/12/2023     The patient location is: work    Visit type: audiovisual    Face to Face time with patient: 20  30 minutes of total time spent on the encounter, which includes face to face time and non-face to face time preparing to see the patient (eg, review of tests), Obtaining and/or reviewing separately obtained history, Documenting clinical information in the electronic or other health record, Independently interpreting results (not separately reported) and communicating results to the patient/family/caregiver, or Care coordination (not separately reported).     Each patient to whom he or she provides medical services by telemedicine is:  (1) informed of the relationship between the physician and patient and the respective role of any other health care provider with respect to management of the patient; and (2) notified that he or she may decline to receive medical services by telemedicine and may withdraw from such care at any time.    The patient's last visit with me was on 1/18/2023.     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  She is being treated and followed at [facility] where she was treated w/ 30 rounds of proton beam starting 11/11/19 (as part of clinical trial).  Prior to proton beam she had DI and central hypothyroidism, also lonstanding hx of hypogonadotropic hypogonadism and GH deficiency .  She was started on LT4 and ddavp (now off ddavp).  She will continue follow up at [facility] for periodic imaging.    Interval Hx:  Since her last visit she has been feeling very well with no new complaints or medical problems today.    She has schedueld follow up at [facility] in July 2023  where will have labs        She was seen at the fertility institute to discuss pregnancy options.  Had normal AMH but imaging with small uterus.   Still not having any menstrual bleeding on topical estrogen       denies symptoms of adrenal insufficiency (no lightheadedness, N/V/abd pain, hypotension).     Denies new HA or peripheral vision change  hvf (adrian visual fields) at [facility] 12/14/22 - Mild bitemporal loss , stable 2+ years    Brain MRI 06/06/2022 - [facility]  The residual solid and partially enhancing suprasellar mass is again demonstrated and is not appreciably changed in size, morphology, or signal characteristics.  The mass measures approximately 2.1 X 1.8 X 1.7 cm.  The mass demonstrates intrinsic T1 hyperintensity as well as signal loss on the T2 weighted images and susceptibility on the diffusion-weighted sequences consistent with calcification.  Consistent with craniopharyngioma    Central Hypothyroidism:  Currently taking levothyroxine 75 mcg daily.  No symptoms of hypo/hyperthyroidism.    fT4 at goal in upper half of normal range.   12/12/22 fT4 1.5 (1-2.1)    Thyroid Symptoms  Normal energy    Weight change:  []  Gain []  Loss  [x]  Denies     Wt Readings from Last 3 Encounters:   06/28/22 48.7 kg (107 lb 5.8 oz)   12/17/21 46 kg (101 lb 6.6 oz)   09/30/21 45.4 kg (100 lb)        Temperature intolerance:  []  Cold []  Hot   [x]  Denies      GI:  []  Diarrhea []  Constipation [x]  Denies    Integument:  []  Hair loss []  Dry skin [x]  Denies    Other:  []  Palpitation []  tremor     []  Increased anxiety    [x]  Denies        diabetes insipidus- resolved  Was initially on nightly DDAVP but stopped taking it 1/2021 with no polyuria/polydipsia.   No nocturia  Normal Na     hypogonadotropic hypogonadism   Did not go through puberty as a child.  At age 19 she was treated w/ OCP for a few months with start of menstrual bleeding and growth of breast tissue but she stopped it 2/2 increased thirst, was not on  desmopressin at that time.   She reports uterus was small on imaging done with fertility clinic    prescribed vivelle dot estrogen replacement but had skin reaction so changed to estradiol gel with gradual dose escalation  Currently on divigel 2 mg daily   Still w/o menstrual bleeding or spotting  no vaginal dryness  She is interested in trying for a biological child soon    GH deficiency:  Growth plates already fused based on x-rays done at [facility] and feeling well so not on GH replacement now.     Hx:  Initial presentation:   During childhood she stopped gaining height around age 9 while her twin sister had normal growth.  She was seen by a pediatric endocrinologist but per patient report growth plates were fused and she was not offered any treatment and hand no brain imaging.  She did not undergo menarche and had no development of breast tissue.  As an adult she established care with a new PCP who found prolactin to be elevated thus pituitary MRI was obtained showing 1.9 cm craniopharyngioma abutting the optic chiasm.    Per patient hvf (adrian visual fields) - stable  6/2022 -     6/7/21 -     9/17/19 normal HVF (scanned to media 9/26/19)          Current Outpatient Medications:     estradioL (DIVIGEL) 1 mg/gram (0.1 %) topical gel, Place 1.5 g onto the skin once daily., Disp: 45 g, Rfl: 3    levothyroxine (SYNTHROID) 75 MCG tablet, TAKE 1 TABLET(75 MCG) BY MOUTH BEFORE BREAKFAST, Disp: 90 tablet, Rfl: 3    vitamin D (VITAMIN D3) 50 mcg (2,000 unit) Tab, Take 1 tablet (2,000 Units total) by mouth once daily., Disp: 90 tablet, Rfl: 3    ROS see hpi    Objective:   Physical Exam   There were no vitals taken for this visit.  Wt Readings from Last 3 Encounters:   06/28/22 48.7 kg (107 lb 5.8 oz)   12/17/21 46 kg (101 lb 6.6 oz)   09/30/21 45.4 kg (100 lb)   ]    Constitutional:  Pleasant,  in no acute distress.   HENT:   Eyes:     No scleral icterus.   Respiratory:   Effort normal   Neurological:  normal  speech  Psych:   Normal mood and affect.      LABORATORY REVIEW:  8/8/2020:   IGF-1 92 (Z-score is -2.9)   a.m. cortisol 9.2   estradiol 27.2   FSH 5.7  LH 4.29  prolactin 34.5  TSH is 0.037  free T4 1.5 (1-2.1)    Recent labs (in media)  Outside labs December 13, 2021  Sodium 136  Normal CMP HDL 44    Triglycerides 91  Normal CBC  Testosterone 42 (8-48)  A.m. cortisol 9  Estradiol 11.5  FSH 6.9  LH 4.6  Prolactin 31 (1.9-25)  Free T4 1.2 (1.0-2.1)  TSH 0.435 (0.4-4.0)  Twenty-five OH vitamin-D 34  IGF-1 90 (103-326) Z-score -2.9    Labs:  06/06/2022  Sodium 139            [facility] reviewed 12/2022  AMH 5.11   TSH 0.068   Total T4 8.2 (4.5-12.5)  FT4 1.5 (1-2.1)   Cortisol 7.4  Prolactin 28.0   IGF-1 67 (Z-score-3.9)  Estradiol 16.6      Chemistry        Component Value Date/Time     10/05/2020 1623    K 3.8 10/05/2020 1623     10/05/2020 1623    CO2 24 10/05/2020 1623    BUN 9 10/05/2020 1623    CREATININE 0.7 10/05/2020 1623    GLU 79 10/05/2020 1623        Component Value Date/Time    CALCIUM 10.1 10/05/2020 1623    ALKPHOS 104 07/31/2020 0847    AST 25 07/31/2020 0847    ALT 23 07/31/2020 0847    BILITOT 0.2 07/31/2020 0847    ESTGFRAFRICA >60.0 10/05/2020 1623    EGFRNONAA >60.0 10/05/2020 1623          Lab Results   Component Value Date    PROLACTIN 46.8 (H) 08/16/2019    LABLH 3.6 05/28/2012    FSH 5.50 08/16/2019    FSH 5.8 05/28/2012    SOMATMDN 136 (L) 05/28/2012     10/05/2020     07/31/2020     08/01/2019    K 3.8 10/05/2020    K 3.7 07/31/2020    K 4.0 08/01/2019    CALCIUM 10.1 10/05/2020    CALCIUM 9.2 07/31/2020    CALCIUM 9.3 08/01/2019    ALBUMIN 3.5 07/31/2020    ALBUMIN 3.6 08/01/2019    ESTGFRAFRICA >60.0 10/05/2020    ESTGFRAFRICA >60.0 07/31/2020    ESTGFRAFRICA >60.0 08/01/2019    EGFRNONAA >60.0 10/05/2020    EGFRNONAA >60.0 07/31/2020    EGFRNONAA >60.0 08/01/2019     Imaging:    Per patient 12/2021 MRI unchanged    6/7/21 (report in  media)      9/9/19 - MRI Brain with the pt which shows a 1.93 cm (in its greatest extent) x 1.7 cm cystic tumor attached to the pituitary stalk with features typical for a craniopharyngioma. The tumor abuts both the optic chiasm and the hypothalamus.       Assessment/Plan:     Problem List Items Addressed This Visit          1 - High    Female hypogonadism - Primary     As she had a skin reaction to estrogen patchshe has been treated with Divigel now on highest dose but without menstrual bleeding.    She is exploring options for becoming pregnant and is seeing a fertility specialist.      Will change to oral estradiol 0.5 mg daily and reassess in 3 month(s) to see if we can escalate dose.   She will let me know if she has any menstrual bleeding.            Relevant Medications    estradioL (ESTRACE) 0.5 MG tablet       2     Craniopharyngioma     Has been stable in size with no new vision changes on hvf (adrian visual fields).  She will continue with periodic imaging and monitoring at [facility] as part of clinical trial.  Next visit 7/2022    Before actively trying for pregnancy will need local hvf (adrian visual fields) for monitoring              3     Secondary hypothyroidism     continue levothyroxine 75 mcg daily.  Discussed that as estrogen dose increases, need for levothyroxine may increase as well.  She will have labs at [facility] in 7/2023 and send me results              Virtual in 3 month(s) Friday pituitary clinic    Shandra Santamaria MD

## 2023-05-12 NOTE — PATIENT INSTRUCTIONS
Please stop using Divigel and switch to oral estradiol (Estrace) 0.5 mg nightly.  Please let me know if you have any side effects with this medication as we may need to decrease the dose to half a tablet nightly if you are having a lot of abdominal cramping or nausea.      Let me know if you have a menstrual bleed however I do not think this dose of estradiol is high enough to cause sufficient thickening of the lining of the uterus to cause a bleed.  We will work on gradually increasing your dose.    Please send me your next set of labs from [facility] the portal.

## 2023-05-12 NOTE — ASSESSMENT & PLAN NOTE
Has been stable in size with no new vision changes on hvf (adrian visual fields).  She will continue with periodic imaging and monitoring at [facility] as part of clinical trial.  Next visit 7/2022    Before actively trying for pregnancy will need local hvf (adrian visual fields) for monitoring

## 2023-05-12 NOTE — ASSESSMENT & PLAN NOTE
continue levothyroxine 75 mcg daily.  Discussed that as estrogen dose increases, need for levothyroxine may increase as well.  She will have labs at [facility] in 7/2023 and send me results

## 2023-05-12 NOTE — ASSESSMENT & PLAN NOTE
As she had a skin reaction to estrogen patchshe has been treated with Divigel now on highest dose but without menstrual bleeding.    She is exploring options for becoming pregnant and is seeing a fertility specialist.      Will change to oral estradiol 0.5 mg daily and reassess in 3 month(s) to see if we can escalate dose.   She will let me know if she has any menstrual bleeding.

## 2023-07-24 ENCOUNTER — PATIENT MESSAGE (OUTPATIENT)
Dept: ENDOCRINOLOGY | Facility: CLINIC | Age: 23
End: 2023-07-24
Payer: COMMERCIAL

## 2023-08-18 ENCOUNTER — OFFICE VISIT (OUTPATIENT)
Dept: ENDOCRINOLOGY | Facility: CLINIC | Age: 23
End: 2023-08-18
Payer: COMMERCIAL

## 2023-08-18 ENCOUNTER — PATIENT MESSAGE (OUTPATIENT)
Dept: ENDOCRINOLOGY | Facility: CLINIC | Age: 23
End: 2023-08-18

## 2023-08-18 DIAGNOSIS — Q89.2 GROWTH HORMONE DEFICIENCY WITH PITUITARY ANOMALY: Primary | ICD-10-CM

## 2023-08-18 DIAGNOSIS — E03.8 SECONDARY HYPOTHYROIDISM: ICD-10-CM

## 2023-08-18 DIAGNOSIS — D44.4 CRANIOPHARYNGIOMA: ICD-10-CM

## 2023-08-18 DIAGNOSIS — E23.0 GROWTH HORMONE DEFICIENCY WITH PITUITARY ANOMALY: Primary | ICD-10-CM

## 2023-08-18 DIAGNOSIS — E23.0: ICD-10-CM

## 2023-08-18 DIAGNOSIS — E28.39 FEMALE HYPOGONADISM: ICD-10-CM

## 2023-08-18 PROCEDURE — 99215 PR OFFICE/OUTPT VISIT, EST, LEVL V, 40-54 MIN: ICD-10-PCS | Mod: 95,,, | Performed by: INTERNAL MEDICINE

## 2023-08-18 PROCEDURE — 99215 OFFICE O/P EST HI 40 MIN: CPT | Mod: 95,,, | Performed by: INTERNAL MEDICINE

## 2023-08-18 RX ORDER — ESTRADIOL 1 MG/1
1 TABLET ORAL DAILY
Qty: 90 TABLET | Refills: 1 | Status: SHIPPED | OUTPATIENT
Start: 2023-08-18 | End: 2023-09-12

## 2023-08-18 RX ORDER — LEVOTHYROXINE SODIUM 75 UG/1
75 TABLET ORAL
Qty: 90 TABLET | Refills: 3 | Status: SHIPPED | OUTPATIENT
Start: 2023-08-18 | End: 2023-10-19 | Stop reason: SDUPTHER

## 2023-08-18 NOTE — PATIENT INSTRUCTIONS
Increase estradiol to 1 mg daily.  Please let me know if you start having menstrual bleeding.  You will get a message from me in December requesting an update on how you are doing with the 1 mg dose.  If you are still not having any bleeding at that time and you are not having any side effects with the 1 mg dose we will plan to increase it to 1.5 mg daily.  I will see you with another virtual visit in about 8 months.    Please review the website below regarding once a week growth hormone injections.  Since your growth hormone levels have been very low due to the craniopharyngioma I recommend starting growth hormone replacement as study show that there may be some cardiovascular benefits in the long run from replacing growth hormone in people who are deficient.  If you would like me to put in a prescription for the growth hormone injections let me know so I can send it to the [facility] and they work on insurance coverage.    Sogroya- once weekly subcutaneous injection of growth hormone   [email or web address]=&[id number]&gclsrc=[email or web address]

## 2023-08-18 NOTE — ASSESSMENT & PLAN NOTE
MRI done at [facility] in July 2023 with stable craniopharyngioma and no change in visual field testing.  She will continue to follow-up with them yearly.

## 2023-08-18 NOTE — ASSESSMENT & PLAN NOTE
Discussed that replacing growth hormone can slightly reduce risk of cardiovascular complications in the long run.  She will consider once a week growth hormone replacement injections and let me know through the portal if she would like to proceed with this.

## 2023-08-18 NOTE — ASSESSMENT & PLAN NOTE
Currently tolerating oral estradiol 0.5 mg daily without any menstrual bleeding.   Will increase to 1 mg daily.  She will update me regarding menstrual bleeding and medication side effects in 4 months and if still not having any bleeding without side effects will plan to increase dose 1.5 mg daily.    Once having menstrual bleeding will add progesterone (can switch to OCP)

## 2023-08-18 NOTE — PROGRESS NOTES
PITUITARY [facility] ENDOCRINOLOGY FOLLOW UP  08/18/2023     The patient location is: work    Visit type: audiovisual    Face to Face time with patient: 30  10 minutes of total time spent on the encounter, which includes face to face time and non-face to face time preparing to see the patient (eg, review of tests), Obtaining and/or reviewing separately obtained history, Documenting clinical information in the electronic or other health record, Independently interpreting results (not separately reported) and communicating results to the patient/family/caregiver, or Care coordination (not separately reported).     Each patient to whom he or she provides medical services by telemedicine is:  (1) informed of the relationship between the physician and patient and the respective role of any other health care provider with respect to management of the patient; and (2) notified that he or she may decline to receive medical services by telemedicine and may withdraw from such care at any time.    The patient's last visit with me was on 5/12/2023.     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  She is being treated and followed at [place] [facility] where she was treated w/ 30 rounds of proton beam starting 11/11/19 (as part of clinical trial).  Prior to proton beam she had DI and central hypothyroidism, also lonstanding hx of hypogonadotropic hypogonadism and GH deficiency .  She was started on LT4 and ddavp (now off ddavp).  She will continue follow up at [facility] for periodic imaging.    Interval Hx:  Since her last visit she has been feeling very well with no new complaints or medical problems today.    At last visit started oral estradiol 0.5 mg w/o and side effects   [facility] in  July 2023  where will have labs     [facility] labs from 07/12/2023:   FT4 1.1   Prolactin 20.7  IGF-1 69 (Z-score-3.8)  Estradiol 30.8    Brain MRI 07/11/2023   Stable exam.  Treated craniopharyngioma is unchanged measuring 2.1 cm craniocaudal and 1.8 cm transverse    She was seen at the fertility institute to discuss pregnancy options.  Had normal AMH but imaging with small uterus.   Still not having any menstrual bleeding   Not actively trying to get pregnant but is being proactive about pregnancy options in the future.    denies symptoms of adrenal insufficiency (no lightheadedness, N/V/abd pain, hypotension).     Denies new HA or peripheral vision change  hvf (adrian visual fields) at [facility] 7/2023 - Mild bitemporal loss , stable 2+ years      Central Hypothyroidism:  Currently taking levothyroxine 75 mcg daily.  No symptoms of hypo/hyperthyroidism.    fT4 at goal in upper half of normal range.     Thyroid Symptoms  Normal energy    Weight change:  []  Gain []  Loss  [x]  Denies     Wt Readings from Last 3 Encounters:   06/28/22 48.7 kg (107 lb 5.8 oz)   12/17/21 46 kg (101 lb 6.6 oz)   09/30/21 45.4 kg (100 lb)        Temperature intolerance:  []  Cold []  Hot   [x]  Denies      GI:  []  Diarrhea []  Constipation [x]  Denies    Integument:  []  Hair loss []  Dry skin [x]  Denies    Other:  []  Palpitation []  tremor     []  Increased anxiety    [x]  Denies      diabetes insipidus- resolved  Was initially on nightly DDAVP but stopped taking it 1/2021 with no polyuria/polydipsia.   No nocturia  Normal Na     hypogonadotropic hypogonadism   Did not go through puberty as a child.  At age 19 she was treated w/ OCP for a few months with start of menstrual bleeding and growth of breast tissue but she stopped it 2/2 increased thirst, was not on desmopressin at that time.   She reports uterus was small on imaging done with fertility clinic    prescribed vivelle dot estrogen replacement but had skin reaction so  changed to estradiol gel with gradual dose escalation  Then on on divigel up to 2 mg daily until she was changed to oral Estrace 0.5 mg daily in May 2023   Still w/o menstrual bleeding or spotting  no vaginal dryness  She has had some breast tenderness and enlargement since on oral estrogen replacement    GH deficiency:  Growth plates already fused based on x-rays done at [facility] and feeling well so not on GH replacement now.   She will reconsider growth hormone replacement as it is now available in a once a week subcutaneous injection    Hx:  Initial presentation:   During childhood she stopped gaining height around age 9 while her twin sister had normal growth.  She was seen by a pediatric endocrinologist but per patient report growth plates were fused and she was not offered any treatment and hand no brain imaging.  She did not undergo menarche and had no development of breast tissue.  As an adult she established care with a new PCP who found prolactin to be elevated thus pituitary MRI was obtained showing 1.9 cm craniopharyngioma abutting the optic chiasm.    Per patient hvf (adrian visual fields) - stable  7/2023 - stable (in media)  6/2022 -     6/7/21 -     9/17/19 normal HVF (scanned to media 9/26/19)          Current Outpatient Medications:     estradioL (ESTRACE) 0.5 MG tablet, Take 1 tablet (0.5 mg total) by mouth once daily., Disp: 30 tablet, Rfl: 3    levothyroxine (SYNTHROID) 75 MCG tablet, TAKE 1 TABLET(75 MCG) BY MOUTH BEFORE BREAKFAST, Disp: 90 tablet, Rfl: 3    vitamin D (VITAMIN D3) 50 mcg (2,000 unit) Tab, Take 1 tablet (2,000 Units total) by mouth once daily., Disp: 90 tablet, Rfl: 3    ROS see hpi    Objective:   Physical Exam   There were no vitals taken for this visit.  Wt Readings from Last 3 Encounters:   06/28/22 48.7 kg (107 lb 5.8 oz)   12/17/21 46 kg (101 lb 6.6 oz)   09/30/21 45.4 kg (100 lb)   ]    Constitutional:  Pleasant,  in no acute distress.   HENT:   Eyes:     No scleral  icterus.   Respiratory:   Effort normal   Neurological:  normal speech  Psych:   Normal mood and affect.      LABORATORY REVIEW:  8/8/2020:   IGF-1 92 (Z-score is -2.9)   a.m. cortisol 9.2   estradiol 27.2   FSH 5.7  LH 4.29  prolactin 34.5  TSH is 0.037  free T4 1.5 (1-2.1)    Recent labs (in media)  Outside labs December 13, 2021  Sodium 136  Normal CMP HDL 44    Triglycerides 91  Normal CBC  Testosterone 42 (8-48)  A.m. cortisol 9  Estradiol 11.5  FSH 6.9  LH 4.6  Prolactin 31 (1.9-25)  Free T4 1.2 (1.0-2.1)  TSH 0.435 (0.4-4.0)  Twenty-five OH vitamin-D 34  IGF-1 90 (103-326) Z-score -2.9    Labs:  06/06/2022  Sodium 139            [facility] reviewed 12/2022  AMH 5.11   TSH 0.068   Total T4 8.2 (4.5-12.5)  FT4 1.5 (1-2.1)   Cortisol 7.4  Prolactin 28.0   IGF-1 67 (Z-score-3.9)  Estradiol 16.6      Chemistry        Component Value Date/Time     10/05/2020 1623    K 3.8 10/05/2020 1623     10/05/2020 1623    CO2 24 10/05/2020 1623    BUN 9 10/05/2020 1623    CREATININE 0.7 10/05/2020 1623    GLU 79 10/05/2020 1623        Component Value Date/Time    CALCIUM 10.1 10/05/2020 1623    ALKPHOS 104 07/31/2020 0847    AST 25 07/31/2020 0847    ALT 23 07/31/2020 0847    BILITOT 0.2 07/31/2020 0847    ESTGFRAFRICA >60.0 10/05/2020 1623    EGFRNONAA >60.0 10/05/2020 1623          Lab Results   Component Value Date    PROLACTIN 46.8 (H) 08/16/2019    LABLH 3.6 05/28/2012    FSH 5.50 08/16/2019    FSH 5.8 05/28/2012    SOMATMDN 136 (L) 05/28/2012     10/05/2020     07/31/2020     08/01/2019    K 3.8 10/05/2020    K 3.7 07/31/2020    K 4.0 08/01/2019    CALCIUM 10.1 10/05/2020    CALCIUM 9.2 07/31/2020    CALCIUM 9.3 08/01/2019    ALBUMIN 3.5 07/31/2020    ALBUMIN 3.6 08/01/2019    ESTGFRAFRICA >60.0 10/05/2020    ESTGFRAFRICA >60.0 07/31/2020    ESTGFRAFRICA >60.0 08/01/2019    EGFRNONAA >60.0 10/05/2020    EGFRNONAA >60.0 07/31/2020    EGFRNONAA >60.0 08/01/2019     Imaging:    Per  patient 12/2021 MRI unchanged    6/7/21 (report in media)      9/9/19 - MRI Brain with the pt which shows a 1.93 cm (in its greatest extent) x 1.7 cm cystic tumor attached to the pituitary stalk with features typical for a craniopharyngioma. The tumor abuts both the optic chiasm and the hypothalamus.       Assessment/Plan:     Problem List Items Addressed This Visit          1 - High    Female hypogonadism     Currently tolerating oral estradiol 0.5 mg daily without any menstrual bleeding.   Will increase to 1 mg daily.  She will update me regarding menstrual bleeding and medication side effects in 4 months and if still not having any bleeding without side effects will plan to increase dose 1.5 mg daily.    Once having menstrual bleeding will add progesterone (can switch to OCP)         Relevant Medications    estradioL (ESTRACE) 1 MG tablet    Other Relevant Orders    ESTRADIOL       2     Craniopharyngioma     MRI done at [facility] in July 2023 with stable craniopharyngioma and no change in visual field testing.  She will continue to follow-up with them yearly.            3     Childhood onset growth hormone deficiency in adult     Discussed that replacing growth hormone can slightly reduce risk of cardiovascular complications in the long run.  She will consider once a week growth hormone replacement injections and let me know through the portal if she would like to proceed with this.         Secondary hypothyroidism     continue levothyroxine 75 mcg daily.  as estrogen dose increases, need for levothyroxine may increase as well.           Relevant Medications    levothyroxine (SYNTHROID) 75 MCG tablet    Other Relevant Orders    TSH    T4     Other Visit Diagnoses       Growth hormone deficiency with pituitary anomaly    -  Primary    Relevant Orders    Insulin-Like Growth Factor              Virtual in 8 month(s) Friday pituitary clinic with labs 2 wks prior (TSH, fT4, IGF-1, estradiol)    Shandra Santamaria  MD

## 2023-08-18 NOTE — ASSESSMENT & PLAN NOTE
continue levothyroxine 75 mcg daily.  as estrogen dose increases, need for levothyroxine may increase as well.

## 2023-08-18 NOTE — Clinical Note
Needs recall for Virtual in 8 month(s) Friday pituitary clinic with labs 2 wks prior (TSH, fT4, IGF-1, estradiol)

## 2023-09-11 ENCOUNTER — PATIENT MESSAGE (OUTPATIENT)
Dept: ENDOCRINOLOGY | Facility: CLINIC | Age: 23
End: 2023-09-11
Payer: COMMERCIAL

## 2023-09-11 DIAGNOSIS — E28.39 FEMALE HYPOGONADISM: Primary | ICD-10-CM

## 2023-09-12 RX ORDER — ESTRADIOL 0.5 MG/1
0.75 TABLET ORAL DAILY
Qty: 135 TABLET | Refills: 0 | Status: SHIPPED | OUTPATIENT
Start: 2023-09-12 | End: 2023-10-19 | Stop reason: SDUPTHER

## 2023-09-12 NOTE — TELEPHONE ENCOUNTER
Having side effects with 1 mg daily estradiol so will decrease down to 0.75 mg daily and reassess in a few months to see if we can increase dose at that time.    1. Female hypogonadism  - estradioL (ESTRACE) 0.5 MG tablet; Take 1.5 tablets (0.75 mg total) by mouth once daily.  Dispense: 135 tablet; Refill: 0

## 2023-10-19 DIAGNOSIS — E55.9 VITAMIN D DEFICIENCY: ICD-10-CM

## 2023-10-19 DIAGNOSIS — E28.39 FEMALE HYPOGONADISM: ICD-10-CM

## 2023-10-19 DIAGNOSIS — E03.8 SECONDARY HYPOTHYROIDISM: ICD-10-CM

## 2023-10-20 RX ORDER — LEVOTHYROXINE SODIUM 75 UG/1
75 TABLET ORAL
Qty: 90 TABLET | Refills: 3 | Status: SHIPPED | OUTPATIENT
Start: 2023-10-20 | End: 2023-12-11

## 2023-10-20 RX ORDER — ESTRADIOL 0.5 MG/1
0.75 TABLET ORAL DAILY
Qty: 135 TABLET | Refills: 0 | Status: SHIPPED | OUTPATIENT
Start: 2023-10-20 | End: 2023-12-11 | Stop reason: SDUPTHER

## 2023-10-20 RX ORDER — CHOLECALCIFEROL (VITAMIN D3) 50 MCG
2000 TABLET ORAL DAILY
Qty: 90 TABLET | Refills: 3 | Status: SHIPPED | OUTPATIENT
Start: 2023-10-20

## 2023-11-29 ENCOUNTER — TELEPHONE (OUTPATIENT)
Dept: ENDOCRINOLOGY | Facility: CLINIC | Age: 23
End: 2023-11-29
Payer: COMMERCIAL

## 2023-11-29 NOTE — TELEPHONE ENCOUNTER
----- Message from Radha Dickson sent at 11/29/2023 12:45 PM CST -----  Regarding: labs  Contact: [phone]  Pt mom requesting call back regarding what [facility] to go to for labs  [facility] is  fine pt gets off at 3pm please call  to discuss Further

## 2023-11-30 ENCOUNTER — TELEPHONE (OUTPATIENT)
Dept: ENDOCRINOLOGY | Facility: CLINIC | Age: 23
End: 2023-11-30
Payer: COMMERCIAL

## 2023-11-30 ENCOUNTER — PATIENT MESSAGE (OUTPATIENT)
Dept: ENDOCRINOLOGY | Facility: CLINIC | Age: 23
End: 2023-11-30
Payer: COMMERCIAL

## 2023-11-30 DIAGNOSIS — E28.39 FEMALE HYPOGONADISM: Primary | ICD-10-CM

## 2023-11-30 DIAGNOSIS — E03.8 SECONDARY HYPOTHYROIDISM: ICD-10-CM

## 2023-11-30 NOTE — TELEPHONE ENCOUNTER
Speaking to pt and pt is wanting to know if the labs that was order back in august are still good to go to [facility], also wanting to know if they were sent to [facility].

## 2023-11-30 NOTE — TELEPHONE ENCOUNTER
Labs sent to [facility] electronically, patient notify through portal    1. Female hypogonadism  - ESTRADIOL  - Insulin-Like Growth Factor  - TSH  - T4, Free

## 2023-11-30 NOTE — TELEPHONE ENCOUNTER
----- Message from Aparna Chambers MA sent at 11/30/2023  7:57 AM CST -----  Regarding: FW: Missed Call from office  Contact: [phone]    ----- Message -----  From: Mansi Goff  Sent: 11/29/2023   4:29 PM CST  To: [facility] Staff  Subject: Missed Call from office                          Hi, pt called to say she missed a call from the office and would like a call back. Pls call the pt at  [phone] to k with her.   Thank you.

## 2023-12-05 ENCOUNTER — PATIENT MESSAGE (OUTPATIENT)
Dept: ENDOCRINOLOGY | Facility: CLINIC | Age: 23
End: 2023-12-05
Payer: COMMERCIAL

## 2023-12-05 LAB
ESTRADIOL SERPL-MCNC: 43 PG/ML
IGF-I SERPL-MCNC: 36 NG/ML (ref 63–373)
IGF-I Z-SCORE SERPL: -3.1 SD
T4 FREE SERPL-MCNC: 1.1 NG/DL (ref 0.8–1.8)
TSH SERPL-ACNC: 0.29 MIU/L

## 2023-12-11 ENCOUNTER — CLINICAL SUPPORT (OUTPATIENT)
Dept: ENDOCRINOLOGY | Facility: CLINIC | Age: 23
End: 2023-12-11
Payer: COMMERCIAL

## 2023-12-11 DIAGNOSIS — E03.8 SECONDARY HYPOTHYROIDISM: ICD-10-CM

## 2023-12-11 DIAGNOSIS — E28.39 FEMALE HYPOGONADISM: ICD-10-CM

## 2023-12-11 DIAGNOSIS — D44.4 CRANIOPHARYNGIOMA: Primary | ICD-10-CM

## 2023-12-11 PROCEDURE — 99214 PR OFFICE/OUTPT VISIT, EST, LEVL IV, 30-39 MIN: ICD-10-PCS | Mod: 95,,, | Performed by: INTERNAL MEDICINE

## 2023-12-11 PROCEDURE — 99214 OFFICE O/P EST MOD 30 MIN: CPT | Mod: 95,,, | Performed by: INTERNAL MEDICINE

## 2023-12-11 RX ORDER — LEVOTHYROXINE SODIUM 88 UG/1
88 TABLET ORAL
Qty: 30 TABLET | Refills: 11 | Status: SHIPPED | OUTPATIENT
Start: 2023-12-11 | End: 2024-12-10

## 2023-12-11 RX ORDER — ESTRADIOL 1 MG/1
1 TABLET ORAL DAILY
Qty: 90 TABLET | Refills: 1 | Status: SHIPPED | OUTPATIENT
Start: 2023-12-11 | End: 2024-12-10

## 2023-12-11 NOTE — PROGRESS NOTES
[facility] ENDOCRINOLOGY FOLLOW UP  12/11/2023     The patient location is: work    Visit type: audiovisual    Face to Face time with patient: 15  20 minutes of total time spent on the encounter, which includes face to face time and non-face to face time preparing to see the patient (eg, review of tests), Obtaining and/or reviewing separately obtained history, Documenting clinical information in the electronic or other health record, Independently interpreting results (not separately reported) and communicating results to the patient/family/caregiver, or Care coordination (not separately reported).     Each patient to whom he or she provides medical services by telemedicine is:  (1) informed of the relationship between the physician and patient and the respective role of any other health care provider with respect to management of the patient; and (2) notified that he or she may decline to receive medical services by telemedicine and may withdraw from such care at any time.    The patient's last visit with me was on Visit date not found.     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  She is being treated and followed at [facility] where she was treated w/ 30 rounds of proton beam starting 11/11/19 (as part of clinical trial).  Prior to proton beam she had DI and central hypothyroidism, also lonstanding hx of hypogonadotropic hypogonadism and GH deficiency .  She was started on LT4 and ddavp (now off ddavp).  She will continue follow up at [facility] for periodic imaging.    Interval Hx:  Since her last visit she started to have some swelling in her hands and face and was concerned that this may be a side effect of increase dose of estradiol however she continued  to take estradiol 0.75 mg orally daily and swelling completely resolved.  She had no other symptoms aside from the swelling.      She is feeling well today with no complaints and is clinically euthyroid but recent labs show FT4 in the lower half of the normal range.      She has a persistently low IGF-1 but we are holding off on growth hormone replacement at this time until other hormones are optimize.  She has a follow-up with [facility] next week and will discuss safety of growth hormone replacement in the setting of craniopharyngioma.      She reports compliance with estradiol 0.75 mg once a day with no menstrual bleeding.        [facility] labs from 07/12/2023:   FT4 1.1   Prolactin 20.7  IGF-1 69 (Z-score-3.8)  Estradiol 30.8      Component      Latest Ref Rng 12/1/2023   IgF I, LC/MS      63 - 373 ng/mL 36 (L)    Z SCORE (FEMALE)      -2.0 - 2.0 SD -3.1 (L)    Estradiol      pg/mL 43    TSH      mIU/L 0.29 (L)    T4, Free      0.8 - 1.8 ng/dL 1.1       Brain MRI 07/11/2023   Stable exam.  Treated craniopharyngioma is unchanged measuring 2.1 cm craniocaudal and 1.8 cm transverse    She [facility] to discuss pregnancy options.  Had normal AMH but imaging with small uterus.   Still not having any menstrual bleeding   Not actively trying to get pregnant but is being proactive about pregnancy options in the future.    denies symptoms of adrenal insufficiency (no lightheadedness, N/V/abd pain, hypotension).     Denies new HA or peripheral vision change  hvf (adrian visual fields) at [facility] 7/2023 - Mild bitemporal loss , stable 2+ years      Central Hypothyroidism:  Currently taking levothyroxine 75 mcg daily.  No symptoms of hypo/hyperthyroidism.    fT4 in the lower half of the normal range (likely needs a higher dose due to escalation of estradiol dose)     Thyroid Symptoms  Normal energy    Weight change:  []  Gain []  Loss  [x]  Denies     Wt Readings from Last 3 Encounters:    06/28/22 48.7 kg (107 lb 5.8 oz)   12/17/21 46 kg (101 lb 6.6 oz)   09/30/21 45.4 kg (100 lb)        Temperature intolerance:  []  Cold []  Hot   [x]  Denies      GI:  []  Diarrhea []  Constipation [x]  Denies    Integument:  []  Hair loss []  Dry skin [x]  Denies    Other:  []  Palpitation []  tremor     []  Increased anxiety    [x]  Denies      diabetes insipidus- resolved  Was initially on nightly DDAVP but stopped taking it 1/2021 with no polyuria/polydipsia.   No nocturia  Normal Na     hypogonadotropic hypogonadism   Did not go through puberty as a child.  At age 19 she was treated w/ OCP for a few months with start of menstrual bleeding and growth of breast tissue but she stopped it 2/2 increased thirst, was not on desmopressin at that time.   She reports uterus was small on imaging done with fertility clinic    prescribed vivelle dot estrogen replacement but had skin reaction so changed to estradiol gel with gradual dose escalation  Then on on divigel up to 2 mg daily until she was changed to oral Estrace 0.5 mg daily in May 2023   Still w/o menstrual bleeding or spotting  no vaginal dryness  She has had some breast tenderness and enlargement since on oral estrogen replacement    GH deficiency:  Growth plates already fused based on x-rays done at [facility] and feeling well so not on GH replacement now.       Hx:  Initial presentation:   During childhood she stopped gaining height around age 9 while her twin sister had normal growth.  She was seen by a pediatric endocrinologist but per patient report growth plates were fused and she was not offered any treatment and hand no brain imaging.  She did not undergo menarche and had no development of breast tissue.  As an adult she established care with a new PCP who found prolactin to be elevated thus pituitary MRI was obtained showing 1.9 cm craniopharyngioma abutting the optic chiasm.    Per patient hvf (adrian visual fields) - stable  7/2023 - stable (in  media)  6/2022 -     6/7/21 -     9/17/19 normal HVF (scanned to media 9/26/19)          Current Outpatient Medications:     cholecalciferol, vitamin D3, (VITAMIN D3) 50 mcg (2,000 unit) Tab, Take 1 tablet (2,000 Units total) by mouth once daily., Disp: 90 tablet, Rfl: 3    estradioL (ESTRACE) 0.5 MG tablet, Take 1.5 tablets (0.75 mg total) by mouth once daily., Disp: 135 tablet, Rfl: 0    levothyroxine (SYNTHROID) 75 MCG tablet, Take 1 tablet (75 mcg total) by mouth before breakfast., Disp: 90 tablet, Rfl: 3    ROS see hpi    Objective:   Physical Exam   There were no vitals taken for this visit.  Wt Readings from Last 3 Encounters:   06/28/22 48.7 kg (107 lb 5.8 oz)   12/17/21 46 kg (101 lb 6.6 oz)   09/30/21 45.4 kg (100 lb)   ]    Constitutional:  Pleasant,  in no acute distress.   HENT:   Eyes:     No scleral icterus.   Respiratory:   Effort normal   Neurological:  normal speech  Psych:   Normal mood and affect.      LABORATORY REVIEW:  8/8/2020:   IGF-1 92 (Z-score is -2.9)   a.m. cortisol 9.2   estradiol 27.2   FSH 5.7  LH 4.29  prolactin 34.5  TSH is 0.037  free T4 1.5 (1-2.1)    Recent labs (in [place])  Outside labs December 13, 2021  Sodium 136  Normal CMP HDL 44    Triglycerides 91  Normal CBC  Testosterone 42 (8-48)  A.m. cortisol 9  Estradiol 11.5  FSH 6.9  LH 4.6  Prolactin 31 (1.9-25)  Free T4 1.2 (1.0-2.1)  TSH 0.435 (0.4-4.0)  Twenty-five [place] vitamin-D 34  IGF-1 90 (103-326) Z-score -2.9    Labs:  06/06/2022  Sodium 139            [facility] reviewed 12/2022  AMH 5.11   TSH 0.068   Total T4 8.2 (4.5-12.5)  FT4 1.5 (1-2.1)   Cortisol 7.4  Prolactin 28.0   IGF-1 67 (Z-score-3.9)  Estradiol 16.6      Chemistry        Component Value Date/Time     10/05/2020 1623    K 3.8 10/05/2020 1623     10/05/2020 1623    CO2 24 10/05/2020 1623    BUN 9 10/05/2020 1623    CREATININE 0.7 10/05/2020 1623    GLU 79 10/05/2020 1623        Component Value Date/Time    CALCIUM 10.1 10/05/2020 1623     ALKPHOS 104 07/31/2020 0847    AST 25 07/31/2020 0847    ALT 23 07/31/2020 0847    BILITOT 0.2 07/31/2020 0847    ESTGFRAFRICA >60.0 10/05/2020 1623    EGFRNONAA >60.0 10/05/2020 1623          Lab Results   Component Value Date    PROLACTIN 46.8 (H) 08/16/2019    TSH 0.29 (L) 12/01/2023    LABLH 3.6 05/28/2012    FSH 5.50 08/16/2019    FSH 5.8 05/28/2012    SOMATMDN 136 (L) 05/28/2012     10/05/2020     07/31/2020     08/01/2019    K 3.8 10/05/2020    K 3.7 07/31/2020    K 4.0 08/01/2019    CALCIUM 10.1 10/05/2020    CALCIUM 9.2 07/31/2020    CALCIUM 9.3 08/01/2019    ALBUMIN 3.5 07/31/2020    ALBUMIN 3.6 08/01/2019    ESTGFRAFRICA >60.0 10/05/2020    ESTGFRAFRICA >60.0 07/31/2020    ESTGFRAFRICA >60.0 08/01/2019    EGFRNONAA >60.0 10/05/2020    EGFRNONAA >60.0 07/31/2020    EGFRNONAA >60.0 08/01/2019     Imaging:    Per patient 12/2021 MRI unchanged    6/7/21 (report in media)      9/9/19 - MRI Brain with the pt which shows a 1.93 cm (in its greatest extent) x 1.7 cm cystic tumor attached to the pituitary stalk with features typical for a craniopharyngioma. The tumor abuts both the optic chiasm and the hypothalamus.       Assessment/Plan:     Problem List Items Addressed This Visit          1 - High    Female hypogonadism     Tolerating estradiol 0.75 mg once a day with no menstrual bleeding.  Will increase to 1 mg daily.  She will let me know if she starts to have menstrual bleeding.  Will recheck level in 3-4 months before next visit            2     Craniopharyngioma - Primary     MRI done at [facility] in July 2023 with stable craniopharyngioma and no change in visual field testing.  She will continue to follow-up with them periodically and discuss safety of growth hormone replacement..            3     Secondary hypothyroidism     With increase in estradiol dose her FT4 has come down to the lower half of the normal range.  Will increase to levothyroxine 88 mcg daily (can use up the 75  mcg tablets by taking a total of 8 tablets over the span of the week).      Recheck labs with next visit            Virtual in 3-4 month(s) Friday pituitary clinic with labs  prior (fT4 and estradiol electronically sent to [email or web address])    Shandra Santamaria MD

## 2023-12-11 NOTE — ASSESSMENT & PLAN NOTE
MRI done at [facility] in July 2023 with stable craniopharyngioma and no change in visual field testing.  She will continue to follow-up with them periodically and discuss safety of growth hormone replacement..

## 2023-12-11 NOTE — PATIENT INSTRUCTIONS
Please increase estradiol to 1 mg once a day (until you run out of the 0.5 mg tablets you can take 2 of those daily).      Increase levothyroxine to 88 mcg daily (until you run out of the 75 mcg tablets you can take an extra tablet once a week for a total of 8 tablets over the span of the week)     I have electronically ordered labs at [facility] [place] for you to have an estradiol and FT4 level few days before your next visit with me which will be in 3-4 months.      As a reminder, it is typically best to take thyroid hormone first thing in the morning on an empty stomach by itself and then wait at least 30 mintues to eat or drink anything besides water to make sure your body absorbs the full dose of medication.  If you take any calcium, Tums, iron, or multivitamins then please separate them by at 4 hours from your thyroid medication as these supplements can interfere with the thyroid hormone getting absorbed in your gut and being used by your body.  It can be helpful to use a separate pill box for just your thyroid medication because unlike other medications, if you miss a dose of thyroid medication and see that it is still left in your pill box, you can always take it later in the week.  What is important is that on a weekly basis you are taking the same number of thyroid pills.  This cannot be done with most other medications.       Let me know if you plan to stop/start estrogen containing birth control or switch to something without estrogen like an IUD.  Please also let me know if you are trying for a pregnancy or if you become pregnant as these things may necessitate a change in your thyroid hormone dose.

## 2023-12-11 NOTE — ASSESSMENT & PLAN NOTE
Tolerating estradiol 0.75 mg once a day with no menstrual bleeding.  Will increase to 1 mg daily.  She will let me know if she starts to have menstrual bleeding.  Will recheck level in 3-4 months before next visit

## 2023-12-11 NOTE — ASSESSMENT & PLAN NOTE
With increase in estradiol dose her FT4 has come down to the lower half of the normal range.  Will increase to levothyroxine 88 mcg daily (can use up the 75 mcg tablets by taking a total of 8 tablets over the span of the week).      Recheck labs with next visit

## 2023-12-11 NOTE — Clinical Note
Virtual in 3-4 month(s) Friday pituitary clinic with labs  prior (fT4 and estradiol electronically sent to [facility])

## 2023-12-21 ENCOUNTER — TELEPHONE (OUTPATIENT)
Dept: CARDIOLOGY | Facility: CLINIC | Age: 23
End: 2023-12-21
Payer: COMMERCIAL

## 2023-12-21 DIAGNOSIS — I49.9 CARDIAC ARRHYTHMIA, UNSPECIFIED CARDIAC ARRHYTHMIA TYPE: Primary | ICD-10-CM

## 2023-12-21 DIAGNOSIS — R94.31 NONSPECIFIC ABNORMAL ELECTROCARDIOGRAM (ECG) (EKG): Primary | ICD-10-CM

## 2023-12-26 NOTE — PROGRESS NOTES
Subjective:   Patient ID:  June Tobar is a 23 y.o. female who presents for evaluation of Abnormal ECG      HPI:  She presents today for the evaluation of an abnormal ECG.  She is seen at [facility] for a craniopharyngioma which was diagnosed 4 years ago and treated with proton radiation therapy.  At her last visit she had an ECG performed that showed nonspecific ST flattening.  Her ECG done today and personally reviewed by me shows normal sinus rhythm with persistent juvenile T-waves which is a normal variant.  She has no cardiac history.  She exercises on a regular basis without any symptoms.  She has no syncope.  There is no family history of premature coronary disease or congestive heart failure.    Past Medical History:   Diagnosis Date    Hypothyroidism     Hypothyroidism 8/1/2019       History reviewed. No pertinent surgical history.    Social History     Socioeconomic History    Marital status:    Occupational History    Occupation:    Tobacco Use    Smoking status: Never    Smokeless tobacco: Never   Substance and Sexual Activity    Alcohol use: No     Comment: minimal    Drug use: No    Sexual activity: Yes     Partners: Male   Social History Narrative    Exercise:  Gym 3 days a week and works out at home.     Social Determinants of Health     Financial Resource Strain: Low Risk  (12/11/2023)    Overall Financial Resource Strain (CARDIA)     Difficulty of Paying Living Expenses: Not hard at all   Food Insecurity: No Food Insecurity (12/11/2023)    Hunger Vital Sign     Worried About Running Out of Food in the Last Year: Never true     Ran Out of Food in the Last Year: Never true   Transportation Needs: No Transportation Needs (12/11/2023)    PRAPARE - Transportation     Lack of Transportation (Medical): No     Lack of Transportation (Non-Medical): No   Physical Activity: Sufficiently Active (12/11/2023)    Exercise Vital Sign     Days of Exercise per Week: 4  days     Minutes of Exercise per Session: 40 min   Stress: No Stress Concern Present (12/11/2023)    [employer] - Occupational Stress Questionnaire     Feeling of Stress : Not at all   Social Connections: Unknown (12/11/2023)    Social Connection and Isolation Panel [NHANES]     Frequency of Communication with Friends and Family: More than three times a week     Frequency of Social Gatherings with Friends and Family: Twice a week     Active Member of Clubs or Organizations: No     Attends Club or Organization Meetings: Never     Marital Status:    Housing Stability: Low Risk  (12/11/2023)    Housing Stability Vital Sign     Unable to Pay for Housing in the Last Year: No     Number of Places Lived in the Last Year: 1     Unstable Housing in the Last Year: No       Family History   Problem Relation Age of Onset    No Known Problems Mother     No Known Problems Father     No Known Problems Sister     No Known Problems Brother     Heart murmur Maternal Uncle        Patient's Medications   New Prescriptions    No medications on file   Previous Medications    CHOLECALCIFEROL, VITAMIN D3, (VITAMIN D3) 50 MCG (2,000 UNIT) TAB    Take 1 tablet (2,000 Units total) by mouth once daily.    ESTRADIOL (ESTRACE) 1 MG TABLET    Take 1 tablet (1 mg total) by mouth once daily.    LEVOTHYROXINE (SYNTHROID) 88 MCG TABLET    Take 1 tablet (88 mcg total) by mouth before breakfast.   Modified Medications    No medications on file   Discontinued Medications    No medications on file       Review of Systems   Constitutional: Negative for malaise/fatigue and weight gain.   HENT:  Negative for hearing loss.    Eyes:  Negative for visual disturbance.   Cardiovascular:  Negative for chest pain, claudication, dyspnea on exertion, leg swelling, near-syncope, orthopnea, palpitations, paroxysmal nocturnal dyspnea and syncope.   Respiratory:  Negative for cough, shortness of breath, sleep disturbances due to  "breathing, snoring and wheezing.    Endocrine: Negative for cold intolerance, heat intolerance, polydipsia, polyphagia and polyuria.   Hematologic/Lymphatic: Negative for bleeding problem. Does not bruise/bleed easily.   Skin:  Negative for rash and suspicious lesions.   Musculoskeletal:  Negative for arthritis, falls, joint pain, muscle weakness and myalgias.   Gastrointestinal:  Negative for abdominal pain, change in bowel habit, constipation, diarrhea, heartburn, hematochezia, melena and nausea.   Genitourinary:  Negative for hematuria and nocturia.   Neurological:  Negative for excessive daytime sleepiness, dizziness, headaches, light-headedness, loss of balance and weakness.   Psychiatric/Behavioral:  Negative for depression. The patient is not nervous/anxious.    Allergic/Immunologic: Negative for environmental allergies.       /62   Pulse 67   Ht 4' 8" (1.422 m)   Wt 52.4 kg (115 lb 8.3 oz)   SpO2 98%   BMI 25.90 kg/m²     Objective:   Physical Exam  Constitutional:       Appearance: She is well-developed.      Comments:   Short stature   HENT:      Head: Normocephalic and atraumatic.   Eyes:      General: No scleral icterus.     Conjunctiva/sclera: Conjunctivae normal.      Pupils: Pupils are equal, round, and reactive to light.   Neck:      Thyroid: No thyromegaly.      Vascular: No hepatojugular reflux or JVD.      Trachea: No tracheal deviation.   Cardiovascular:      Rate and Rhythm: Normal rate and regular rhythm.      Chest Wall: PMI is not displaced.      Pulses: Intact distal pulses.           Carotid pulses are 2+ on the right side and 2+ on the left side.       Radial pulses are 2+ on the right side and 2+ on the left side.        Dorsalis pedis pulses are 2+ on the right side and 2+ on the left side.        Posterior tibial pulses are 2+ on the right side and 2+ on the left side.      Heart sounds: Normal heart sounds.   Pulmonary:      Effort: Pulmonary effort is normal.      Breath " sounds: Normal breath sounds.   Abdominal:      General: Bowel sounds are normal. There is no distension.      Palpations: Abdomen is soft. There is no mass.      Tenderness: There is no abdominal tenderness.   Musculoskeletal:         General: No tenderness.      Cervical back: Normal range of motion and neck supple.   Lymphadenopathy:      Cervical: No cervical adenopathy.   Skin:     General: Skin is warm and dry.      Findings: No erythema or rash.      Nails: There is no clubbing.   Neurological:      Mental Status: She is alert and oriented to person, place, and time.   Psychiatric:         Speech: Speech normal.         Behavior: Behavior normal.         Lab Results   Component Value Date     10/05/2020    K 3.8 10/05/2020     10/05/2020    CO2 24 10/05/2020    BUN 9 10/05/2020    CREATININE 0.7 10/05/2020    GLU 79 10/05/2020    AST 25 07/31/2020    ALT 23 07/31/2020    ALBUMIN 3.5 07/31/2020    PROT 7.5 07/31/2020    BILITOT 0.2 07/31/2020    WBC 6.79 07/31/2020    HGB 13.6 07/31/2020    HCT 40.8 07/31/2020    MCV 86 07/31/2020     07/31/2020    TSH 0.29 (L) 12/01/2023    CHOL 183 07/31/2020    HDL 49 07/31/2020    LDLCALC 117.8 07/31/2020    TRIG 81 07/31/2020       Assessment:     1. Craniopharyngioma    2. Secondary hypothyroidism    3. Female hypogonadism    4. Childhood onset growth hormone deficiency in adult    5. Nonspecific abnormal electrocardiogram (ECG) (EKG) :  Her ECG shows normal sinus rhythm with persistent juvenile T-waves which is a normal variant.  Her cardiac exam is normal.  No further evaluation is necessary at this time.       Plan:     June was seen today for abnormal ecg.    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  Please do not hesitate to contact me with any questions or concerns.

## 2023-12-27 ENCOUNTER — OFFICE VISIT (OUTPATIENT)
Dept: CARDIOLOGY | Facility: CLINIC | Age: 23
End: 2023-12-27
Payer: COMMERCIAL

## 2023-12-27 ENCOUNTER — HOSPITAL ENCOUNTER (OUTPATIENT)
Dept: CARDIOLOGY | Facility: CLINIC | Age: 23
Discharge: HOME | End: 2023-12-27
Payer: COMMERCIAL

## 2023-12-27 ENCOUNTER — TELEPHONE (OUTPATIENT)
Dept: CARDIOLOGY | Facility: CLINIC | Age: 23
End: 2023-12-27
Payer: COMMERCIAL

## 2023-12-27 VITALS
HEIGHT: 56 IN | DIASTOLIC BLOOD PRESSURE: 62 MMHG | BODY MASS INDEX: 25.98 KG/M2 | OXYGEN SATURATION: 98 % | SYSTOLIC BLOOD PRESSURE: 107 MMHG | HEART RATE: 67 BPM | WEIGHT: 115.5 LBS

## 2023-12-27 DIAGNOSIS — E28.39 FEMALE HYPOGONADISM: ICD-10-CM

## 2023-12-27 DIAGNOSIS — E03.8 SECONDARY HYPOTHYROIDISM: ICD-10-CM

## 2023-12-27 DIAGNOSIS — E23.0: ICD-10-CM

## 2023-12-27 DIAGNOSIS — R94.31 NONSPECIFIC ABNORMAL ELECTROCARDIOGRAM (ECG) (EKG): ICD-10-CM

## 2023-12-27 DIAGNOSIS — D44.4 CRANIOPHARYNGIOMA: Primary | ICD-10-CM

## 2023-12-27 PROCEDURE — 93000 ELECTROCARDIOGRAM COMPLETE: CPT | Mod: S$GLB,,, | Performed by: INTERNAL MEDICINE

## 2023-12-27 PROCEDURE — 99204 OFFICE O/P NEW MOD 45 MIN: CPT | Mod: 25,S$GLB,, | Performed by: INTERNAL MEDICINE

## 2023-12-27 PROCEDURE — 99999 PR PBB SHADOW E&M-EST. PATIENT-LVL III: CPT | Mod: PBBFAC,,, | Performed by: INTERNAL MEDICINE

## 2023-12-27 PROCEDURE — 99204 PR OFFICE/OUTPT VISIT, NEW, LEVL IV, 45-59 MIN: ICD-10-PCS | Mod: 25,S$GLB,, | Performed by: INTERNAL MEDICINE

## 2023-12-27 PROCEDURE — 99999 PR PBB SHADOW E&M-EST. PATIENT-LVL III: ICD-10-PCS | Mod: PBBFAC,,, | Performed by: INTERNAL MEDICINE

## 2023-12-27 PROCEDURE — 93000 EKG 12-LEAD: ICD-10-PCS | Mod: S$GLB,,, | Performed by: INTERNAL MEDICINE

## 2024-03-18 ENCOUNTER — PATIENT MESSAGE (OUTPATIENT)
Dept: ENDOCRINOLOGY | Facility: CLINIC | Age: 24
End: 2024-03-18
Payer: COMMERCIAL

## 2024-04-16 ENCOUNTER — PATIENT MESSAGE (OUTPATIENT)
Dept: ENDOCRINOLOGY | Facility: CLINIC | Age: 24
End: 2024-04-16

## 2024-04-16 ENCOUNTER — OFFICE VISIT (OUTPATIENT)
Dept: ENDOCRINOLOGY | Facility: CLINIC | Age: 24
End: 2024-04-16
Payer: COMMERCIAL

## 2024-04-16 DIAGNOSIS — D44.4 CRANIOPHARYNGIOMA: Primary | ICD-10-CM

## 2024-04-16 DIAGNOSIS — N91.2 AMENORRHEA: ICD-10-CM

## 2024-04-16 DIAGNOSIS — E03.8 SECONDARY HYPOTHYROIDISM: ICD-10-CM

## 2024-04-16 DIAGNOSIS — E28.39 FEMALE HYPOGONADISM: ICD-10-CM

## 2024-04-16 DIAGNOSIS — E03.8 SECONDARY HYPOTHYROIDISM: Primary | ICD-10-CM

## 2024-04-16 PROCEDURE — G2211 COMPLEX E/M VISIT ADD ON: HCPCS | Mod: 95,,, | Performed by: INTERNAL MEDICINE

## 2024-04-16 PROCEDURE — 99214 OFFICE O/P EST MOD 30 MIN: CPT | Mod: 95,,, | Performed by: INTERNAL MEDICINE

## 2024-04-16 RX ORDER — LEVOTHYROXINE SODIUM 100 UG/1
100 TABLET ORAL
Qty: 30 TABLET | Refills: 11 | Status: SHIPPED | OUTPATIENT
Start: 2024-04-16 | End: 2025-04-16

## 2024-04-16 NOTE — PATIENT INSTRUCTIONS
Please send us your most recent labs from [facility] so we can see the free T4 level.     Ask them what they will use for your trigger injection because certain medications like GNRH agonists will not work if your pituitary gland is not functioning properly.  They may need to use a different medication like HCG which acts like the pituitary hormones.

## 2024-04-16 NOTE — PROGRESS NOTES
I have reviewed and concur with Dr. Lorenzo Baker's history, physical, assessment, and plan.  I have personally interviewed and examined the patient.  See below addendum for my evaluation and additional findings.    Patient is planning to start IVF cycle with [facility] and is currently completing progesterone challenge.  Discussed that due to pituitary dysfunction she needs to avoid fertility meds that act on the pituitary gland like GNRH agonists/antagonists.  Patient will upload most recent labs from fertility clinic and if fT4 not included will need to recheck this as TSH is not accurate in the setting of secondary hypothyroidism.  If no withdrawal bleed from progesterone challenge may need to consider OCP but will defer to fertility clinic.     Shandra Santamaria MD

## 2024-04-16 NOTE — Clinical Note
Hello,   I am seeing this mutual patient in pituitary clinic for hx of craniopharyngioma treated with proton beam with primary amenorrhea and secondary hypothyroidism due to the mass effect of the craniopharyngioma.  I do not have access to her medical records from [facility] but she will try to upload those for me.   She mentioned that her most recent pelvic ultrasound showed thickening of the endometrium and she is currently completing a progesterone challenge then planning on starting IVF.    If she doesn't have a withdrawal bleed are you still planning on proceeding with the cycle or would you but her on a combined OCP first?  Also, what you you planning for her trigger?  She will not respond to GNRH agonists since she does not have normal pituitary function.  Just wanted to loop you in.  Thanks,  Shandra

## 2024-04-16 NOTE — TELEPHONE ENCOUNTER
History of secondary hypothyroidism with FT4 below goal, will increase from levothyroxine  mcg daily.      Please schedule FT4 in 1 month    1. Secondary hypothyroidism    - levothyroxine (SYNTHROID) 100 MCG tablet; Take 1 tablet (100 mcg total) by mouth before breakfast.  Dispense: 30 tablet; Refill: 11  - T4, Free; Future

## 2024-04-16 NOTE — PROGRESS NOTES
PITUITARY [facility] ENDOCRINOLOGY FOLLOW UP  04/16/2024     The patient location is: work    Visit type: audiovisual    Face to Face time with patient: 20  45 minutes of total time spent on the encounter, which includes face to face time and non-face to face time preparing to see the patient (eg, review of tests), Obtaining and/or reviewing separately obtained history, Documenting clinical information in the electronic or other health record, Independently interpreting results (not separately reported) and communicating results to the patient/family/caregiver, or Care coordination (not separately reported).     Each patient to whom he or she provides medical services by telemedicine is:  (1) informed of the relationship between the physician and patient and the respective role of any other health care provider with respect to management of the patient; and (2) notified that he or she may decline to receive medical services by telemedicine and may withdraw from such care at any time.    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  She is being treated and followed at [facility] where she was treated w/ 30 rounds of proton beam starting 11/11/19 (as part of clinical trial).  Prior to proton beam she had DI and central hypothyroidism, also lonstanding hx of hypogonadotropic hypogonadism and GH deficiency .  She was started on LT4 and ddavp (now off ddavp).  She will continue follow up at [facility] for periodic imaging.    Interval Hx:  Since her last visit she started to have some swelling in her hands and face and was concerned that this may be a side effect of increase dose of estradiol however she continued to take estradiol  0.75 mg orally daily and swelling completely resolved.  She had no other symptoms aside from the swelling.      She is feeling well today with no complaints and is clinically euthyroid but recent labs show FT4 in the lower half of the normal range.      She has a persistently low IGF-1 but we are holding off on growth hormone replacement at this time until other hormones are optimize.  She has a follow-up with [facility] next week and will discuss safety of growth hormone replacement in the setting of craniopharyngioma.      She reports compliance with estradiol 0.75 mg once a day with no menstrual bleeding.        [facility] Labs 12/19/2023:  TSH:  0.805  T4:  7.2  Free T4:  1.0  Reverse T3:  9.8  Total T3:  1.23  Cortisol:  13.6  Prolactin:  31.6  IGF-FB:  11  IGF-1:  70 L (Z-score -3.6)  LH:  2.3   FSH:  2.3  Vitamin D:  27 L  Estradiol:  39.8      [facility] Labs 07/12/2023:   FT4 1.1   Prolactin 20.7  IGF-1 69 (Z-score-3.8)  Estradiol 30.8    Component      Latest Ref Rng 12/1/2023   IgF I, LC/MS      63 - 373 ng/mL 36 (L)    Z SCORE (FEMALE)      -2.0 - 2.0 SD -3.1 (L)    Estradiol      pg/mL 43    TSH      mIU/L 0.29 (L)    T4, Free      0.8 - 1.8 ng/dL 1.1       12/19/2021 MRI  Again seen is a mixed solid and cystic, heterogeneously enhancing suprasellar mass in the retrochiasmatic region, involving bilateral optic tracts and extending into the anteroinferior third ventricle. The mass again measures up to 2.0 x 1.8 cm (transverse by craniocaudad) on the coronal CISS sequences, stable from multiple prior exams dating back to 11/30/2020. The pituitary gland is stable in appearance, with the neurohypophysis again distinctly seen. The cavernous sinuses demonstrate homogenous postcontrast enhancement bilaterally.     She was seen [facility] the fertility institute to discuss pregnancy options.  Had normal AMH but imaging with small uterus.   Still not having any menstrual bleeding   Not actively trying to get  pregnant but is being proactive about pregnancy options in the future.    denies symptoms of adrenal insufficiency (no lightheadedness, N/V/abd pain, hypotension).     Denies new HA or peripheral vision change  hvf (adrian visual [facility]) at [facility] 7/2023 - Mild bitemporal loss , stable 2+ years    Central Hypothyroidism:  Currently taking levothyroxine 88 mcg daily.  No symptoms of hypo/hyperthyroidism.    fT4 in the lower half of the normal range (likely needs a higher dose due to escalation of estradiol dose)     Thyroid Symptoms  Normal energy    Weight change:  []  Gain []  Loss  [x]  Denies     Wt Readings from Last 3 Encounters:   12/27/23 52.4 kg (115 lb 8.3 oz)   06/28/22 48.7 kg (107 lb 5.8 oz)   12/17/21 46 kg (101 lb 6.6 oz)        Temperature intolerance:  []  Cold []  Hot   [x]  Denies      GI:  []  Diarrhea []  Constipation [x]  Denies    Integument:  []  Hair loss []  Dry skin [x]  Denies    Other:  []  Palpitation []  tremor     []  Increased anxiety    [x]  Denies      diabetes insipidus- resolved  Was initially on nightly DDAVP but stopped taking it 1/2021 with no polyuria/polydipsia.   No nocturia  Normal Na     hypogonadotropic hypogonadism   Did not go through puberty as a child.  At age 19 she was treated w/ OCP for a few months with start of menstrual bleeding and growth of breast tissue but she stopped it 2/2 increased thirst, was not on desmopressin at that time.   She reports uterus was small on imaging done with fertility clinic, now improving recently.    prescribed vivelle dot estrogen replacement but had skin reaction so changed to estradiol gel with gradual dose escalation  Then on on divigel up to 2 mg daily until she was changed to oral Estrace 0.5 mg daily in May 2023, now on 1 mg dose.   Still w/o menstrual bleeding or spotting.  S/P progesterone challenge this week.    no vaginal dryness  She has had some breast tenderness and enlargement since on oral estrogen  replacement    GH deficiency:  Growth plates already fused based on x-rays done at [facility] and feeling well so not on GH replacement now.     Hx:  Initial presentation:   During childhood she stopped gaining height around age 9 while her twin sister had normal growth.  She was seen by a pediatric endocrinologist but per patient report growth plates were fused and she was not offered any treatment and hand no brain imaging.  She did not undergo menarche and had no development of breast tissue.  As an adult she established care with a new PCP who found prolactin to be elevated thus pituitary MRI was obtained showing 1.9 cm craniopharyngioma abutting the optic chiasm.    Per patient hvf (adrian visual fields) - stable  7/2023 - stable (in media)  6/2022 -     6/7/21 -     9/17/19 normal HVF (scanned to media 9/26/19)        Current Outpatient Medications:     cholecalciferol, vitamin D3, (VITAMIN D3) 50 mcg (2,000 unit) Tab, Take 1 tablet (2,000 Units total) by mouth once daily., Disp: 90 tablet, Rfl: 3    estradioL (ESTRACE) 1 MG tablet, Take 1 tablet (1 mg total) by mouth once daily., Disp: 90 tablet, Rfl: 1    levothyroxine (SYNTHROID) 88 MCG tablet, Take 1 tablet (88 mcg total) by mouth before breakfast., Disp: 30 tablet, Rfl: 11    ROS see HPI    Objective:   Physical Exam   There were no vitals taken for this visit.  Wt Readings from Last 3 Encounters:   12/27/23 52.4 kg (115 lb 8.3 oz)   06/28/22 48.7 kg (107 lb 5.8 oz)   12/17/21 46 kg (101 lb 6.6 oz)   ]    Constitutional:  Pleasant,  in no acute distress.   HENT:   Eyes:     No scleral icterus.   Respiratory:   Effort normal   Neurological:  normal speech  Psych:  Normal mood and affect.      LABORATORY REVIEW:  8/8/2020:   IGF-1 92 (Z-score is -2.9)   a.m. cortisol 9.2   estradiol 27.2   FSH 5.7  LH 4.29  prolactin 34.5  TSH is 0.037  free T4 1.5 (1-2.1)    Recent labs (in media)  Outside labs December 13, 2021  Sodium 136  Normal CMP HDL 44  LDL  124  Triglycerides 91  Normal CBC  Testosterone 42 (8-48)  A.m. cortisol 9  Estradiol 11.5  FSH 6.9  LH 4.6  Prolactin 31 (1.9-25)  Free T4 1.2 (1.0-2.1)  TSH 0.435 (0.4-4.0)  Twenty-five OH vitamin-D 34  IGF-1 90 (103-326) Z-score -2.9    Labs:  06/06/2022  Sodium 139            [facility] reviewed 12/2022  AMH 5.11   TSH 0.068   Total T4 8.2 (4.5-12.5)  FT4 1.5 (1-2.1)   Cortisol 7.4  Prolactin 28.0   IGF-1 67 (Z-score-3.9)  Estradiol 16.6      Chemistry        Component Value Date/Time     10/05/2020 1623    K 3.8 10/05/2020 1623     10/05/2020 1623    CO2 24 10/05/2020 1623    BUN 9 10/05/2020 1623    CREATININE 0.7 10/05/2020 1623    GLU 79 10/05/2020 1623        Component Value Date/Time    CALCIUM 10.1 10/05/2020 1623    ALKPHOS 104 07/31/2020 0847    AST 25 07/31/2020 0847    ALT 23 07/31/2020 0847    BILITOT 0.2 07/31/2020 0847    ESTGFRAFRICA >60.0 10/05/2020 1623    EGFRNONAA >60.0 10/05/2020 1623          Lab Results   Component Value Date    PROLACTIN 46.8 (H) 08/16/2019    TSH 0.29 (L) 12/01/2023    LABLH 3.6 05/28/2012    FSH 5.50 08/16/2019    FSH 5.8 05/28/2012    SOMATMDN 136 (L) 05/28/2012     10/05/2020     07/31/2020     08/01/2019    K 3.8 10/05/2020    K 3.7 07/31/2020    K 4.0 08/01/2019    CALCIUM 10.1 10/05/2020    CALCIUM 9.2 07/31/2020    CALCIUM 9.3 08/01/2019    ALBUMIN 3.5 07/31/2020    ALBUMIN 3.6 08/01/2019    ESTGFRAFRICA >60.0 10/05/2020    ESTGFRAFRICA >60.0 07/31/2020    ESTGFRAFRICA >60.0 08/01/2019    EGFRNONAA >60.0 10/05/2020    EGFRNONAA >60.0 07/31/2020    EGFRNONAA >60.0 08/01/2019     Imaging: ([facility])        6/7/21 (report in media)      9/9/19 - MRI Brain with the pt which shows a 1.93 cm (in its greatest extent) x 1.7 cm cystic tumor attached to the pituitary stalk with features typical for a craniopharyngioma. The tumor abuts both the optic chiasm and the hypothalamus.       Assessment/Plan:     Problem List Items Addressed This  Visit          Renal/    Amenorrhea     Awaiting withdrawal bleed after her progesterone challenge.  Fertility clinic now assisting.           Female hypogonadism     Remains on Estradiol 1 mg daily and just completed a regimen of progesterone after meeting with [facility] 2 weeks prior.  She had imaging with them which showed appearance of the uterus had improved prompting the progesterone challenge.  Now awaiting withdrawal bleed.  Reviewed the need to avoid fertility meds (GNRH agonists) that require an intact pituitary function of LH and FSH production.  May need to consider use of OCP but will defer to fertility clinic.  Message to be sent separately to Dr. Little from [facility] about pituitary dysfunction with LH/FSH production and need to adjust treatments.              Oncology    Craniopharyngioma - Primary     Most recent MRI was December 2023 and this was stable compared to prior studies.  She can continue to follow with [facility] for further care related to her craniopharyngioma.              Endocrine    Secondary hypothyroidism     After her last visit she had her dose of Levothyroxine increase from 75 mcg to 88 mcg.  Pending repeat labs but may have had appropriate labs with the fertility clinic recently.  She will send us those labs and if no Free T4 noted we will arrange this separately.  She knows to reach out with these results and place them on the portal for us.      Discussed that she needs to notify us if she has a positive pregnancy test so we can adjust her thyroid medication doses and likely add 2 additional levothyroxine pills a week to her regimen.              Virtual visit in 6 month(s) in pituitary clinic.  Labs soon if no Free T4 available.      **Visit today included increased complexity associated with the care of the problems addressed and managing the longitudinal care of the patient due to the serious and/or complex managed problems    Lorenzo Baker

## 2024-04-16 NOTE — ASSESSMENT & PLAN NOTE
Remains on Estradiol 1 mg daily and just completed a regimen of progesterone after meeting with [facility] 2 weeks prior.  She had imaging with them which showed appearance of the uterus had improved prompting the progesterone challenge.  Now awaiting withdrawal bleed.  Reviewed the need to avoid fertility meds (GNRH agonists) that require an intact pituitary function of LH and FSH production.  May need to consider use of OCP but will defer to fertility clinic.  Message to be sent separately to Dr. Little from [facility] about pituitary dysfunction with LH/FSH production and need to adjust treatments.

## 2024-04-16 NOTE — ASSESSMENT & PLAN NOTE
Most recent MRI was December 2023 and this was stable compared to prior studies.  She can continue to follow with [facility] for further care related to her craniopharyngioma.  If pregnancy confirmed in the future she will need close monitoring of HVF with Dr. Carrillo at that time.

## 2024-04-16 NOTE — ASSESSMENT & PLAN NOTE
After her last visit she had her dose of Levothyroxine increase from 75 mcg to 88 mcg.  Pending repeat labs but may have had appropriate labs with the fertility clinic recently.  She will send us those labs and if no Free T4 noted we will arrange this separately.  She knows to reach out with these results and place them on the portal for us.      Discussed that she needs to notify us if she has a positive pregnancy test so we can adjust her thyroid medication doses and likely add 2 additional levothyroxine pills a week to her regimen.

## 2024-04-30 DIAGNOSIS — Z00.00 ROUTINE MEDICAL EXAM: Primary | ICD-10-CM

## 2024-11-14 DIAGNOSIS — E03.8 SECONDARY HYPOTHYROIDISM: ICD-10-CM

## 2024-11-14 DIAGNOSIS — E28.39 FEMALE HYPOGONADISM: ICD-10-CM

## 2024-11-14 RX ORDER — ESTRADIOL 1 MG/1
1 TABLET ORAL DAILY
Qty: 90 TABLET | Refills: 1 | Status: SHIPPED | OUTPATIENT
Start: 2024-11-14 | End: 2025-11-14

## 2024-11-14 RX ORDER — LEVOTHYROXINE SODIUM 100 UG/1
100 TABLET ORAL
Qty: 30 TABLET | Refills: 11 | Status: SHIPPED | OUTPATIENT
Start: 2024-11-14 | End: 2025-11-14

## 2024-12-05 NOTE — PROGRESS NOTES
ENDOCRINOLOGY FOLLOW UP:  12/06/2024     The patient location is: work    Visit type: audiovisual    Face to Face time with patient: 16 minutes  45 minutes of total time spent on the encounter, which includes face to face time and non-face to face time preparing to see the patient (eg, review of tests), Obtaining and/or reviewing separately obtained history, Documenting clinical information in the electronic or other health record, Independently interpreting results (not separately reported) and communicating results to the patient/family/caregiver, or Care coordination (not separately reported).     Each patient to whom he or she provides medical services by telemedicine is:  (1) informed of the relationship between the physician and patient and the respective role of any other health care provider with respect to management of the patient; and (2) notified that he or she may decline to receive medical services by telemedicine and may withdraw from such care at any time.    The patient's last visit was 04/16/2024 with Dr. 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  She is being treated and followed at [facility] where she was treated w/ 30 rounds of proton beam starting 11/11/19 (as part of clinical trial).  Prior to proton beam she had DI and central hypothyroidism, also lonstanding hx of hypogonadotropic hypogonadism and GH deficiency. Found to need thyroid hormone replacement prior to proton beam therapy as well.  She was started on LT4 and ddavp (now off ddavp).  She will continue follow up at [facility] for periodic imaging.    Interval Hx:  Had last MRI imaging in June 2024 (stable) at [facility]  Had HVF testing in June which was normal  She will be seeing  [facility] soon as IUI did not work and now discussion will be for IVF    She is feeling well today with no complaints and is clinically euthyroid but FT4 needs to be updated  She has a persistently low IGF-1 but we are holding off on growth hormone replacement at this time until other hormones are optimized in setting of planned IVF.      She reports compliance with estradiol and will be performing a progesterone challenge soon       6/5/2024:  MRI Pituitary scan    FINDINGS:   There is essentially stable appearance of a previous identified mixed solid and cystic heterogeneously enhancing suprasellar mass, measuring 1.7 x 2.0 x 1.7 cm in maximal AP, transverse and craniocaudal dimensions, essentially unchanged from multiple recent prior exams, allowing for slight differences in patient positioning/slice selection. No significant new tumoral signal laterality or abnormal enhancement is seen. There is stable appearing articular distortion of adjacent structures. There is morphologically normal appearance of the pituitary gland, within a normal-sized sella.       [facility] 06/05/2024:  TSH:  1.25  T4:  7.9  Free T4: 1.0  Reverse T3: 11.0  Total T3: 1.06  Cortisol: 8.6  Prolactin: 18.3  IGF-1:   59 L (Z-score -4.3)  LH:  2.11  FSH:  3.6  Vitamin D: 33  Estradiol: 10.1      12/19/2021 MRI  Again seen is a mixed solid and cystic, heterogeneously enhancing suprasellar mass in the retrochiasmatic region, involving bilateral optic tracts and extending into the anteroinferior third ventricle. The mass again measures up to 2.0 x 1.8 cm (transverse by craniocaudad) on the coronal CISS sequences, stable from multiple prior exams dating back to 11/30/2020. The pituitary gland is stable in appearance, with the neurohypophysis again distinctly seen. The cavernous sinuses demonstrate homogenous postcontrast enhancement bilaterally.     She was seen at the fertility [facility] to discuss pregnancy options.  Had  normal AMH but imaging with small uterus.   Still not having any menstrual bleeding   Not actively trying to get pregnant but is being proactive about pregnancy options in the future.    denies symptoms of adrenal insufficiency (no lightheadedness, N/V/abd pain, hypotension).     Denies new HA or peripheral vision change  hvf ([facility]) at [facility] 7/2023 - Mild bitemporal loss , stable 2+ years    Central Hypothyroidism:  Currently taking levothyroxine 100 mcg daily.  No symptoms of hypo/hyperthyroidism.    fT4 in the upper half of the normal range    Thyroid Symptoms  Normal energy    Weight change:  []  Gain []  Loss  [x]  Denies     Wt Readings from Last 3 Encounters:   12/27/23 52.4 kg (115 lb 8.3 oz)   06/28/22 48.7 kg (107 lb 5.8 oz)   12/17/21 46 kg (101 lb 6.6 oz)        Temperature intolerance:  []  Cold []  Hot   [x]  Denies      GI:  []  Diarrhea []  Constipation [x]  Denies    Integument:  []  Hair loss []  Dry skin [x]  Denies    Other:  []  Palpitation []  tremor     []  Increased anxiety    [x]  Denies      diabetes insipidus- resolved  Was initially on nightly DDAVP but stopped taking it 1/2021 with no polyuria/polydipsia.    Normal Na     hypogonadotropic hypogonadism   Did not go through puberty as a child.  At age 19 she was treated w/ OCP for a few months with start of menstrual bleeding and growth of breast tissue but she stopped it 2/2 increased thirst, was not on desmopressin at that time.   She reports uterus was small on imaging done with fertility clinic, now improving recently.    prescribed vivelle dot estrogen replacement but had skin reaction so changed to estradiol gel with gradual dose escalation  Then on on divigel up to 2 mg daily until she was changed to oral Estrace 0.5 mg daily in May 2023  Continues on HRT with fertility clinic  Will have bleeds after progesterone challenges, repeat challenge coming up    GH deficiency:  Growth plates already fused based on x-rays  "done at [facility] and feeling well so not on GH replacement now.     Hx:  Initial presentation:   During childhood she stopped gaining height around age 9 while her twin sister had normal growth.  She was seen by a pediatric endocrinologist but per patient report growth plates were fused and she was not offered any treatment and hand no brain imaging.  She did not undergo menarche and had no development of breast tissue.  As an adult she established care with a new PCP who found prolactin to be elevated thus pituitary MRI was obtained showing 1.9 cm craniopharyngioma abutting the optic chiasm.    Per patient hvf 6/24 ([facility]) - stable  7/2023 - stable (in media)  6/2022 -     6/7/21 -     9/17/19 normal HVF (scanned to media 9/26/19)        Current Outpatient Medications:     cholecalciferol, vitamin D3, (VITAMIN D3) 50 mcg (2,000 unit) Tab, Take 1 tablet (2,000 Units total) by mouth once daily., Disp: 90 tablet, Rfl: 3    estradioL (ESTRACE) 1 MG tablet, Take 1 tablet (1 mg total) by mouth once daily., Disp: 90 tablet, Rfl: 1    levothyroxine (SYNTHROID) 100 MCG tablet, Take 1 tablet (100 mcg total) by mouth before breakfast., Disp: 30 tablet, Rfl: 11    ROS see HPI    Objective:   Physical Exam   There were no vitals taken for this visit.  Wt Readings from Last 3 Encounters:   12/27/23 52.4 kg (115 lb 8.3 oz)   06/28/22 48.7 kg (107 lb 5.8 oz)   12/17/21 46 kg (101 lb 6.6 oz)       Ht: 4'8"  Ht: 115  BMI: 25.8      Physical Exam  Constitutional:       Appearance: Normal appearance.   Neurological:      Mental Status: She is alert.   Psychiatric:         Mood and Affect: Mood normal.         Behavior: Behavior normal.           Chemistry        Component Value Date/Time     10/05/2020 1623    K 3.8 10/05/2020 1623     10/05/2020 1623    CO2 24 10/05/2020 1623    BUN 9 10/05/2020 1623    CREATININE 0.7 10/05/2020 1623    GLU 79 10/05/2020 1623        Component Value Date/Time    CALCIUM " 10.1 10/05/2020 1623    ALKPHOS 104 07/31/2020 0847    AST 25 07/31/2020 0847    ALT 23 07/31/2020 0847    BILITOT 0.2 07/31/2020 0847    ESTGFRAFRICA >60.0 10/05/2020 1623    EGFRNONAA >60.0 10/05/2020 1623          Lab Results   Component Value Date    PROLACTIN 46.8 (H) 08/16/2019    TSH 0.29 (L) 12/01/2023    LABLH 3.6 05/28/2012    FSH 5.50 08/16/2019    FSH 5.8 05/28/2012    SOMATMDN 136 (L) 05/28/2012     10/05/2020     07/31/2020     08/01/2019    K 3.8 10/05/2020    K 3.7 07/31/2020    K 4.0 08/01/2019    CALCIUM 10.1 10/05/2020    CALCIUM 9.2 07/31/2020    CALCIUM 9.3 08/01/2019    ALBUMIN 3.5 07/31/2020    ALBUMIN 3.6 08/01/2019    ESTGFRAFRICA >60.0 10/05/2020    ESTGFRAFRICA >60.0 07/31/2020    ESTGFRAFRICA >60.0 08/01/2019    EGFRNONAA >60.0 10/05/2020    EGFRNONAA >60.0 07/31/2020    EGFRNONAA >60.0 08/01/2019     Imaging: ([facility]        6/7/21 (report in media)      9/9/19 - MRI Brain with the pt which shows a 1.93 cm (in its greatest extent) x 1.7 cm cystic tumor attached to the pituitary stalk with features typical for a craniopharyngioma. The tumor abuts both the optic chiasm and the hypothalamus.       Assessment/Plan:     Amenorrhea  Has had prior withdrawal bleed after her progesterone challenge.    Repeat progesterone challenge coming up.  Fertility clinic now assisting.     Female hypogonadism  Remains on Estradiol daily with [place] [facility].  Has upcoming visit with them in 2 weeks for further fertility plans (likely IVF). Attempted IUI but failed earlier in the year.       Prior visits it was reviewed the need to avoid fertility meds (GNRH agonists) that require an intact pituitary function of LH and FSH production.  Will defer to fertility clinic.      Craniopharyngioma  Most recent MRI was June 2024 and this was stable compared to prior studies.  She can continue to follow with [facility] for further care related to her craniopharyngioma.     Secondary  "hypothyroidism  Prior labs from June reviewed and at goal.  Now that IVF is planned soon we will repeat TSH and Free T4.  TSH is felt to be less reliable in her case so ideally goal will be Free T4 in upper end of normal range.  For now will continue on Levothyroxine 100 mcg tablets once a day.  Reviewed need to notify us if she becomes pregnant and plans to likely increase to 9 tabs a week.    Will need closer monitoring of thyroid function during pregnancy as well.       RTC in 12 months.   Labs soon for TFTs.    **Visit today included increased complexity associated with the care of the problems addressed and managing the longitudinal care of the patient due to the serious and/or complex managed problems    Lorenzo Baker    *Portions of this note may have been created with voice recognition software. Occasional "wrong word "or "sound alike" substitutions may have occurred due to the inherent limitations of voice recognition software.  Please excuse errors. Please, read the note carefully and recognize, using context, where substitutions have occurred     "

## 2024-12-06 ENCOUNTER — OFFICE VISIT (OUTPATIENT)
Facility: CLINIC | Age: 24
End: 2024-12-06
Payer: COMMERCIAL

## 2024-12-06 DIAGNOSIS — D44.4 CRANIOPHARYNGIOMA: ICD-10-CM

## 2024-12-06 DIAGNOSIS — N91.2 AMENORRHEA: ICD-10-CM

## 2024-12-06 DIAGNOSIS — E28.39 FEMALE HYPOGONADISM: ICD-10-CM

## 2024-12-06 DIAGNOSIS — E03.8 SECONDARY HYPOTHYROIDISM: Primary | ICD-10-CM

## 2024-12-06 RX ORDER — LEVOTHYROXINE SODIUM 100 UG/1
100 TABLET ORAL
Qty: 30 TABLET | Refills: 11 | Status: SHIPPED | OUTPATIENT
Start: 2024-12-06 | End: 2025-12-06

## 2024-12-06 NOTE — ASSESSMENT & PLAN NOTE
Most recent MRI was June 2024 and this was stable compared to prior studies.  She can continue to follow with [facility] for further care related to her craniopharyngioma.

## 2024-12-06 NOTE — ASSESSMENT & PLAN NOTE
Prior labs from June reviewed and at goal.  Now that IVF is planned soon we will repeat TSH and Free T4.  TSH is felt to be less reliable in her case so ideally goal will be Free T4 in upper end of normal range.  For now will continue on Levothyroxine 100 mcg tablets once a day.  Reviewed need to notify us if she becomes pregnant and plans to likely increase to 9 tabs a week.    Will need closer monitoring of thyroid function during pregnancy as well.

## 2024-12-06 NOTE — ASSESSMENT & PLAN NOTE
Remains on Estradiol daily with [facility].  Has upcoming visit with them in 2 weeks for further fertility plans (likely IVF). Attempted IUI but failed earlier in the year.       Prior visits it was reviewed the need to avoid fertility meds (GNRH agonists) that require an intact pituitary function of LH and FSH production.  Will defer to fertility clinic.

## 2024-12-06 NOTE — ASSESSMENT & PLAN NOTE
Has had prior withdrawal bleed after her progesterone challenge.    Repeat progesterone challenge coming up.  Fertility clinic now assisting.

## 2024-12-06 NOTE — PATIENT INSTRUCTIONS
Thank you for meeting with me for your virtual endocrine visit. Below is a summary of our discussion, planned workup, and any changes to your treatment plan:    1. Concerns Addressed:  Hypothyroidism will be monitored with some updated labs.  I will refill your current dose of medicine for now.    In the future I would be interested to know what your physicians at [facility] think of growth hormone replacement.  That is an option since levels have remained low but it would need to be approved by them given your history of craniopharyngioma.  There is a chance of tumor growth with GH replacement so that is why we have been cautious.  We also did not want to interfere with pregnancy plans either.  It is still an option to consider GH replacement in the future as deficiency can lead to issues with fatigue, reduced muscle strength, depression and reduced bone density among other things.  I just would want to be sure [facility] is comfortable with that in the future.  You can see what their thoughts are on the next visit with them.      2. Planned Workup:  Laboratory Tests: TSH and Free T4  Imaging: Continue MRI monitoring with [facility]    3. Treatment Plan:  New Medications: No new therapies  Changes to Current Treatments: No change, continue 100 mcg Levothyroxine for now  If you become pregnant please let us know and then increase to 9 tablets a week of thyroid hormone.  We will repeat labs and be sure that adjustment is working for you.      4. Education and Information:  As a review, it is best to take the levothyroxine in the morning on an empty stomach, and not eat, drink or take medications for at least ~30 minutes after taking it to maximize its absorption.  Please avoid taking any multi-vitamins (anything with iron) or calcium supplements for at least 4 hours after taking the medication.  If you miss the dose on one day, take two doses the next morning to make up for the missed dose    5. Follow-Up:  For now we  can adjust to visits ever year but continue thyroid labs about every 6 months.  If new issues or concerns come up then we can see you sooner.          **This summary was prepared using voice recognition software and may contain minor spelling, grammatical errors, or sound-alike words. Please do not hesitate to reach out if clarification is needed.**

## 2024-12-07 ENCOUNTER — LAB VISIT (OUTPATIENT)
Dept: LAB | Facility: HOSPITAL | Age: 24
End: 2024-12-07
Payer: COMMERCIAL

## 2024-12-07 DIAGNOSIS — E03.8 SECONDARY HYPOTHYROIDISM: ICD-10-CM

## 2024-12-07 LAB
T4 FREE SERPL-MCNC: 1.23 NG/DL (ref 0.71–1.51)
TSH SERPL DL<=0.005 MIU/L-ACNC: 0.78 UIU/ML (ref 0.4–4)

## 2024-12-07 PROCEDURE — 84439 ASSAY OF FREE THYROXINE: CPT | Performed by: STUDENT IN AN ORGANIZED HEALTH CARE EDUCATION/TRAINING PROGRAM

## 2024-12-07 PROCEDURE — 36415 COLL VENOUS BLD VENIPUNCTURE: CPT | Performed by: STUDENT IN AN ORGANIZED HEALTH CARE EDUCATION/TRAINING PROGRAM

## 2024-12-07 PROCEDURE — 84443 ASSAY THYROID STIM HORMONE: CPT | Performed by: STUDENT IN AN ORGANIZED HEALTH CARE EDUCATION/TRAINING PROGRAM

## 2024-12-09 DIAGNOSIS — E03.8 SECONDARY HYPOTHYROIDISM: Primary | ICD-10-CM

## 2025-05-09 ENCOUNTER — PATIENT MESSAGE (OUTPATIENT)
Dept: INTERNAL MEDICINE | Facility: CLINIC | Age: 25
End: 2025-05-09
Payer: COMMERCIAL

## 2025-05-09 ENCOUNTER — OFFICE VISIT (OUTPATIENT)
Facility: CLINIC | Age: 25
End: 2025-05-09
Payer: COMMERCIAL

## 2025-05-09 DIAGNOSIS — N91.2 AMENORRHEA: ICD-10-CM

## 2025-05-09 DIAGNOSIS — D44.4 CRANIOPHARYNGIOMA: ICD-10-CM

## 2025-05-09 DIAGNOSIS — E28.39 FEMALE HYPOGONADISM: ICD-10-CM

## 2025-05-09 DIAGNOSIS — E03.8 SECONDARY HYPOTHYROIDISM: Primary | ICD-10-CM

## 2025-05-09 NOTE — Clinical Note
Can you have this patient obtain the TSH and Free T4 labs which are scheduled with her for next month sooner.  She can have them this week or next instead.  She said she prefers the lab at [facility] care side off [address].  Please adjust labs to there this month.  Follow up in 1 year.  Thanks

## 2025-05-09 NOTE — PROGRESS NOTES
ENDOCRINOLOGY FOLLOW UP VISIT: 05/09/2025    The patient location is: Home  The chief complaint leading to consultation is: Craniopharyngioma follow up    Visit type: audiovisual    Face to Face time with patient: 21 minutes  45 minutes of total time spent on the encounter, which includes face to face time and non-face to face time preparing to see the patient (eg, review of tests), Obtaining and/or reviewing separately obtained history, Documenting clinical information in the electronic or other health record, Independently interpreting results (not separately reported) and communicating results to the patient/family/caregiver, or Care coordination (not separately reported).     Each patient to whom he or she provides medical services by telemedicine is:  (1) informed of the relationship between the physician and patient and the respective role of any other health care provider with respect to management of the patient; and (2) notified that he or she may decline to receive medical services by telemedicine and may withdraw from such care at any time.    Subjective:      Patient ID: June Tobar is a 24 y.o. female.    Chief Complaint:  Follow-up    History of Present Illness      June Tobar presents today for follow up and to obtain medical clearance for adoption.    She has known history of craniopharyngioma and has also been followed at [facility] where she was treated w/ 30 rounds of proton beam starting 11/11/19 (as part of clinical trial).  Prior to proton beam she had DI and central hypothyroidism, also lonstanding hx of hypogonadotropic hypogonadism and GH deficiency. Found to need thyroid hormone replacement prior to proton beam therapy as well.  She was started on LT4 and ddavp (now off ddavp).  She will continue follow up at [facility] for periodic imaging and sees us for thyroid dosing and monitoring as well.    She reports good energy and mood. She denies temperature sensitivity, palpitations,  tremors, and anxiety. She denies hair loss but notes increased hair growth and thickness. MRI from January 2025 showed stable appearance of treated craniopharyngioma compared to prior exams dating back to 2020. She has a history of diabetes insipidus which resolved approximately three years ago after discontinuation of Desmopressin. She takes levothyroxine 100 mcg and estrogen daily in the morning. She also takes a multivitamin and vitamin D3 supplement. She denies taking any calcium or iron supplements. No medication changes since last visit. She previously attempted IUI which was unsuccessful. She has decided to pursue adoption instead of further fertility treatments but may attempt IVF in the future ([employer] was who she followed with).              1/20/25:  MRI Pituitary scan     FINDINGS:   Brain:   Again seen is a mixed solid and cystic, heterogeneously enhancing suprasellar mass in the retrochiasmatic region, involving bilateral optic tracts and extending into the anteroinferior third ventricle. The mass again measures up to 2.0 x 1.8 cm (transverse by craniocaudad) on the coronal CISS sequences, stable from multiple prior exams dating back to 11/30/2020. The pituitary gland is stable in appearance, with the pars nervosa again distinctly seen. The cavernous sinuses demonstrate homogenous postcontrast enhancement bilaterally.        [facility] Results (Jan 2025):  TSH: 0.936 mIU/L - within normal limits  Total T4: 8.0 µg/dL - normal  Free T4: 1.1 ng/dL - normal  Total T3: 1.04 ng/mL - normal  Reverse T3: 9.9 ng/dL - normal  8:00 a.m. Cortisol: 10.4 µg/dL - normal  Prolactin: 27 ng/mL - mildly elevated  IGFBP-1 (Insulin-like Growth Factor Binding Protein-1): 12 ng/mL - normal  LH: 4.58 mIU/mL - normal  FSH: 3.9 mIU/mL - normal  Vitamin D (25-OH): 24 ng/mL - mildly low    [facility] 06/05/2024:  TSH:                1.25  T4:                   7.9  Free T4:          1.0  Reverse T3:     11.0  Total T3:          1.06  Cortisol:           8.6  Prolactin:         18.3  IGF-1:              59 L (Z-score -4.3)  LH:                   2.11  FSH:                3.6  Vitamin D:       33  Estradiol:         10.1        Denies symptoms of adrenal insufficiency (no lightheadedness, N/V/abd pain, hypotension).     Denies new HA or peripheral vision change  hvf (adrian visual fields) at [facility] 7/2023 - Mild bitemporal loss , stable 2+ years     Central Hypothyroidism:  Currently taking levothyroxine 100 mcg daily.    Taking first thing in the morning and takes with estrogen pill  No symptoms of hypo/hyperthyroidism.    fT4 in the upper half of the normal range     Thyroid Symptoms  Normal energy     Weight change:  []  Gain           []  Loss                       [x]  Denies                Wt Readings from Last 3 Encounters:   12/27/23 52.4 kg (115 lb 8.3 oz)   06/28/22 48.7 kg (107 lb 5.8 oz)   12/17/21 46 kg (101 lb 6.6 oz)         Temperature intolerance:  []  Cold           []  Hot                         [x]  Denies      GI:  []  Diarrhea    []  Constipation          [x]  Denies     Integument:  []  Hair loss    []  Dry skin     [x]  Denies     Other:  []  Palpitation []  tremor             []  Increased anxiety                          [x]  Denies        diabetes insipidus- resolved  Was initially on nightly DDAVP but stopped taking it 1/2021 with no polyuria/polydipsia.    Normal Na      hypogonadotropic hypogonadism   Did not go through puberty as a child.  At age 19 she was treated w/ OCP for a few months with start of menstrual bleeding and growth of breast tissue but she stopped it 2/2 increased thirst, was not on desmopressin at that time.   She reports uterus was small on imaging done with fertility clinic, now improving recently.     prescribed vivelle dot estrogen replacement but had skin reaction so changed to estradiol gel with gradual dose escalation  Then on on divigel up to 2 mg daily  "until she was changed to oral Estrace 0.5 mg daily in May 2023  Continues on HRT with fertility clinic, now on estrogen with variable progesterone use to induce cycles/withdrawal bleeds  Had been having bleeds after progesterone challenges     GH deficiency:  Growth plates already fused based on x-rays done at [facility] and feeling well so not on GH replacement now.      Hx:  Initial presentation:               During childhood she stopped gaining height around age 9 while her twin sister had normal growth.  She was seen by a pediatric endocrinologist but per patient report growth plates were fused and she was not offered any treatment and hand no brain imaging.  She did not undergo menarche and had no development of breast tissue.  As an adult she established care with a new PCP who found prolactin to be elevated thus pituitary MRI was obtained showing 1.9 cm craniopharyngioma abutting the optic chiasm.     Per patient hvf 6/24 (adrian visual fields) - stable  7/2023 - stable (in media)  6/2022 -     6/7/21 -     9/17/19 normal HVF (scanned to media 9/26/19)          ROS:   As above    Objective:     There were no vitals taken for this visit.  BP Readings from Last 3 Encounters:   12/27/23 107/62   06/28/22 98/62   12/17/21 104/69     Wt Readings from Last 1 Encounters:   12/27/23 1005 52.4 kg (115 lb 8.3 oz)     There is no height or weight on file to calculate BMI.    Physical Exam    General: No acute distress. Nontoxic appearing.  Psychiatric: Normal mood. Normal affect. No evidence of SI.           Lab Review:   No results found for: "HGBA1C"  Lab Results   Component Value Date    CHOL 183 07/31/2020    HDL 49 07/31/2020    LDLCALC 117.8 07/31/2020    TRIG 81 07/31/2020    CHOLHDL 26.8 07/31/2020     Lab Results   Component Value Date     10/05/2020    K 3.8 10/05/2020     10/05/2020    CO2 24 10/05/2020    GLU 79 10/05/2020    BUN 9 10/05/2020    CREATININE 0.7 10/05/2020    CALCIUM 10.1 " "10/05/2020    PROT 7.5 07/31/2020    ALBUMIN 3.5 07/31/2020    BILITOT 0.2 07/31/2020    ALKPHOS 104 07/31/2020    AST 25 07/31/2020    ALT 23 07/31/2020    ANIONGAP 11 10/05/2020    ESTGFRAFRICA >60.0 10/05/2020    EGFRNONAA >60.0 10/05/2020    TSH 0.779 12/07/2024     No results found for: "LIGYOLVY86OV"    Assessment and Plan       IMPRESSION:  - Updated thyroid labs (TSH and free T4) due to concern for central hypothyroidism from craniopharyngioma history.  - Deferred growth hormone replacement due to lack of overt symptoms.  - Recent cortisol levels normal and not concerning.  - Sodium levels stable without symptoms of DI.  - Mildly elevated prolactin likely due to prior craniopharyngioma treatment; no intervention needed.  - Continue vitamin D supplementation appropriate for mildly low level.    CRANIOPHARYNGIOMA:  - Provided information on Sogroya, a weekly growth hormone injection option, for future consideration.  - MRI imaging monitored by [facility] (latest MRI stable)  - Full pituitary panels have been monitored with [facility] as well with intermittent thyroid testing with us    SECONDARY HYPOTHYROIDISM:  - Continue levothyroxine 100 mcg daily, taken in the morning.  - If pregnancy occurs, increase by 2 pills per week (9 pills total per week).  - May consider dose increase if free T4 < upper half of normal range.  - Ordered TSH and free T4 labs.    FEMALE HYPOGONADISM/AMENORRHEA:  - Explained importance of withdrawal bleeding every 1-3 months for endometrial health.  - Recommend aiming for at least 4-6 menstrual cycles per year for bone health but further discussion can occur with OBGYN as well.    LIFESTYLE CHANGES:  - Follow up in 1 year.  - Contact the office with any questions or concerns.    PLAN SUMMARY:  - Ordered TSH, and free T4 labs for lab draw soon  - Continue levothyroxine 100 mcg daily, taken in the morning  - Increase by 2 pills per week (9 pills total) if pregnancy occurs  - Provided " information on Sogroya, a weekly growth hormone injection option  - Ensure withdrawal bleeding every 1-3 months for endometrial health  - Follow up in 1 year; may consider dose increase if free T4 < upper half of normal range  - Contact office with any questions or concerns     Endocrinology Summary for Adoption Clearance:    The patient has a history of treated craniopharyngioma with stable follow-up and appropriate hormone replacement. Her thyroid function is well-managed on levothyroxine, and recent endocrine labs--including cortisol, prolactin, and sodium--are within acceptable ranges. There are no signs of active hormonal dysfunction or instability at this time.    From an endocrinology standpoint, she is in very good health, and I see no concerns that would interfere with her ability to proceed with adoption. I fully support her plan and recommend continued routine follow-up and medication adherence.    RTC in 1 year.  Labs soon      **Visit today included increased complexity associated with the care of the problems addressed and managing the longitudinal care of the patient due to the serious and/or complex managed problems      Lorenzo Baker, DO     This note was generated with the assistance of ambient listening technology. Verbal consent was obtained by the patient and accompanying visitor(s) for the recording of patient appointment to facilitate this note. I attest to having reviewed and edited the generated note for accuracy, though some syntax or spelling errors may persist. Please contact the author of this note for any clarification.

## 2025-05-09 NOTE — PATIENT INSTRUCTIONS
Summary of Todays Discussion and Plan:    Thyroid:  Ordered updated TSH and free T4 levels due to history of craniopharyngioma and possible central hypothyroidism. Continue levothyroxine 100 mcg daily, taken in the morning.  If pregnancy occurs in the future, increase to 9 pills per week (take 2 extra pills weekly).  Dose adjustment may be needed if free T4 is not in the upper half of the normal range.  Growth Hormone:  Deferred GH replacement at this time due to lack of overt symptoms. Provided information on Sogroya below, a once-weekly GH injection, for future consideration.  Cortisol and Sodium:  Recent cortisol levels are normal. Sodium levels are stable with no signs of diabetes insipidus.  Prolactin:  Mild elevation likely related to prior craniopharyngioma treatment; no intervention needed.  Vitamin D:  Mildly low level; continue current vitamin D supplementation (9275-2608 IU of Vit D3 a day suggested).  Menstrual Health / Female Hypogonadism:  Discussed importance of withdrawal bleeding every 1-3 months to protect the uterine lining.  Aim for at least 4-6 menstrual cycles per year to support bone health. Further discussion can occur with your OB-GYN if needed.  Craniopharyngioma Follow-Up:  Most recent MRI reviewed and stable (followed by [facility]).  Full pituitary hormone panels and thyroid labs have been monitored through both our clinic and [facility].    Next Steps:  Complete lab draw for TSH and free T4 (our staff will set this up).  Continue levothyroxine as above; adjust if planning pregnancy or if labs indicate.  Monitor for symptoms and contact the office with any questions.  Follow up in 1 year unless new concerns arise.      Growth hormone (GH) deficiency is a common consequence of pituitary dysfunction following treatment for craniopharyngioma, particularly after surgery and radiation (e.g., proton beam therapy). GH plays a critical role in maintaining body composition, lipid metabolism, bone  health, cardiovascular function, and overall quality of life in adults.    Symptoms of GH Deficiency in Adults:  Low energy or fatigue  Decreased muscle mass and strength  Increased fat mass, especially visceral fat  Decreased bone density (osteopenia or osteoporosis)  Dyslipidemia (high LDL, low HDL)  Reduced exercise capacity  Mood changes or decreased well-being    Why Consider Sogroya?  Sogroya (somapacitan) is a long-acting, once-weekly injectable recombinant human growth hormone analog, FDA-approved for adult GH deficiency. It offers a more convenient alternative to daily GH injections, which may improve adherence and quality of life for some patients.    Key Benefits of Sogroya:  Once-weekly dosing  Similar efficacy to daily GH in improving body composition and lipid profiles  Well-tolerated in most patients with low immunogenicity  May help restore normal physiologic GH exposure over time    Monitoring and Safety Considerations:  If GH replacement is initiated, the following should be monitored:  IGF-1 levels: Used to titrate the dose and assess response  Blood glucose and HbA1c: GH can increase insulin resistance in some patients  Lipid profile: Often improves with treatment  Body composition and weight  Bone density (DEXA) over time  Side effects: Arthralgia, edema, carpal tunnel symptoms, and potential headache    Contraindications and Cautions:  Active malignancy (GH should not be started in the presence of active cancer)  Uncontrolled diabetes  Active proliferative or severe non-proliferative diabetic retinopathy  Intracranial hypertension (monitor for symptoms like headache, vision changes)    Shared Decision-Making:  GH therapy is optional and based on symptom burden, quality of life, and clinical goals. Some patients prefer non-treatment with close monitoring, while others may benefit from trial of replacement. The use of Sogroya can be revisited at any time based on evolving symptoms and patient  preference.

## 2025-05-13 ENCOUNTER — PATIENT MESSAGE (OUTPATIENT)
Facility: CLINIC | Age: 25
End: 2025-05-13
Payer: COMMERCIAL

## 2025-06-02 ENCOUNTER — OFFICE VISIT (OUTPATIENT)
Dept: INTERNAL MEDICINE | Facility: CLINIC | Age: 25
End: 2025-06-02
Payer: COMMERCIAL

## 2025-06-02 VITALS
SYSTOLIC BLOOD PRESSURE: 114 MMHG | DIASTOLIC BLOOD PRESSURE: 78 MMHG | HEIGHT: 56 IN | HEART RATE: 82 BPM | OXYGEN SATURATION: 98 % | BODY MASS INDEX: 27.53 KG/M2 | WEIGHT: 122.38 LBS

## 2025-06-02 DIAGNOSIS — Z00.00 ANNUAL PHYSICAL EXAM: Primary | ICD-10-CM

## 2025-06-02 DIAGNOSIS — D44.4 CRANIOPHARYNGIOMA: ICD-10-CM

## 2025-06-02 DIAGNOSIS — E28.39 FEMALE HYPOGONADISM: ICD-10-CM

## 2025-06-02 DIAGNOSIS — E03.8 SECONDARY HYPOTHYROIDISM: ICD-10-CM

## 2025-06-02 PROCEDURE — 99385 PREV VISIT NEW AGE 18-39: CPT | Mod: S$GLB,,, | Performed by: INTERNAL MEDICINE

## 2025-06-02 PROCEDURE — 99999 PR PBB SHADOW E&M-EST. PATIENT-LVL III: CPT | Mod: PBBFAC,,, | Performed by: INTERNAL MEDICINE
